# Patient Record
Sex: MALE | Race: WHITE | NOT HISPANIC OR LATINO
[De-identification: names, ages, dates, MRNs, and addresses within clinical notes are randomized per-mention and may not be internally consistent; named-entity substitution may affect disease eponyms.]

---

## 2018-02-02 ENCOUNTER — TRANSCRIPTION ENCOUNTER (OUTPATIENT)
Age: 33
End: 2018-02-02

## 2018-03-03 ENCOUNTER — TRANSCRIPTION ENCOUNTER (OUTPATIENT)
Age: 33
End: 2018-03-03

## 2018-11-10 ENCOUNTER — TRANSCRIPTION ENCOUNTER (OUTPATIENT)
Age: 33
End: 2018-11-10

## 2018-12-15 ENCOUNTER — TRANSCRIPTION ENCOUNTER (OUTPATIENT)
Age: 33
End: 2018-12-15

## 2019-04-20 ENCOUNTER — TRANSCRIPTION ENCOUNTER (OUTPATIENT)
Age: 34
End: 2019-04-20

## 2019-09-01 ENCOUNTER — TRANSCRIPTION ENCOUNTER (OUTPATIENT)
Age: 34
End: 2019-09-01

## 2020-03-01 ENCOUNTER — TRANSCRIPTION ENCOUNTER (OUTPATIENT)
Age: 35
End: 2020-03-01

## 2020-03-06 ENCOUNTER — TRANSCRIPTION ENCOUNTER (OUTPATIENT)
Age: 35
End: 2020-03-06

## 2020-04-30 ENCOUNTER — TRANSCRIPTION ENCOUNTER (OUTPATIENT)
Age: 35
End: 2020-04-30

## 2020-07-09 ENCOUNTER — TRANSCRIPTION ENCOUNTER (OUTPATIENT)
Age: 35
End: 2020-07-09

## 2020-07-17 ENCOUNTER — TRANSCRIPTION ENCOUNTER (OUTPATIENT)
Age: 35
End: 2020-07-17

## 2020-07-21 ENCOUNTER — APPOINTMENT (OUTPATIENT)
Dept: SURGERY | Facility: CLINIC | Age: 35
End: 2020-07-21
Payer: COMMERCIAL

## 2020-07-21 VITALS
HEART RATE: 60 BPM | TEMPERATURE: 97.9 F | BODY MASS INDEX: 23.97 KG/M2 | WEIGHT: 203 LBS | SYSTOLIC BLOOD PRESSURE: 144 MMHG | DIASTOLIC BLOOD PRESSURE: 89 MMHG | RESPIRATION RATE: 16 BRPM | HEIGHT: 77 IN | OXYGEN SATURATION: 98 %

## 2020-07-21 DIAGNOSIS — Z80.7 FAMILY HISTORY OF OTHER MALIGNANT NEOPLASMS OF LYMPHOID, HEMATOPOIETIC AND RELATED TISSUES: ICD-10-CM

## 2020-07-21 DIAGNOSIS — K62.89 OTHER SPECIFIED DISEASES OF ANUS AND RECTUM: ICD-10-CM

## 2020-07-21 DIAGNOSIS — Z83.71 FAMILY HISTORY OF COLONIC POLYPS: ICD-10-CM

## 2020-07-21 DIAGNOSIS — Z82.49 FAMILY HISTORY OF ISCHEMIC HEART DISEASE AND OTHER DISEASES OF THE CIRCULATORY SYSTEM: ICD-10-CM

## 2020-07-21 DIAGNOSIS — K60.0 ACUTE ANAL FISSURE: ICD-10-CM

## 2020-07-21 DIAGNOSIS — Z91.89 OTHER SPECIFIED PERSONAL RISK FACTORS, NOT ELSEWHERE CLASSIFIED: ICD-10-CM

## 2020-07-21 PROCEDURE — 99203 OFFICE O/P NEW LOW 30 MIN: CPT

## 2020-07-21 RX ORDER — MULTIVITAMIN
TABLET ORAL
Refills: 0 | Status: ACTIVE | COMMUNITY

## 2020-07-21 RX ORDER — UBIDECARENONE/VIT E ACET 100MG-5
CAPSULE ORAL
Refills: 0 | Status: ACTIVE | COMMUNITY

## 2020-07-21 RX ORDER — BACILLUS COAGULANS/INULIN 1B-250 MG
CAPSULE ORAL
Refills: 0 | Status: ACTIVE | COMMUNITY

## 2020-07-21 NOTE — CONSULT LETTER
[Dear  ___] : Dear  [unfilled], [Consult Letter:] : I had the pleasure of evaluating your patient, [unfilled]. [Please see my note below.] : Please see my note below. [Consult Closing:] : Thank you very much for allowing me to participate in the care of this patient.  If you have any questions, please do not hesitate to contact me. [Sincerely,] : Sincerely, [FreeTextEntry3] : Eamon Scanlon M.D., F.A.C.S, F.A.S.C.R.S [DrRadhika  ___] : Dr. TRUONG

## 2020-07-21 NOTE — PHYSICAL EXAM
[Abdomen Tenderness] : ~T No ~M abdominal tenderness [Wheezing] : no wheezing was heard [Normal Rate and Rhythm] : normal rate and rhythm [No Rash or Lesion] : No rash or lesion [Alert] : alert [Calm] : calm [de-identified] : Perianal inspection reveals an acute posterior midline anal fissure.  There is no fistula or abscess.  Anal rectal tone is increased.  Anoscopy was deferred due to his level of discomfort.  There are no enlarged external hemorrhoids or prolapsing internal hemorrhoids [de-identified] : nad [de-identified] : nl

## 2020-07-21 NOTE — ASSESSMENT
[FreeTextEntry1] : In summary the patient has an acute posterior midline anal fissure.  I prescribed topical nitroglycerin and recommended fiber supplementation stool softeners and warm tub baths.  I instructed him to call me in 3 weeks.  If his symptoms persist I would recommend Botox injection.  Once his fissure has healed he should follow-up with Dr. Perez for a screening colonoscoto be 100% sure that there is no proximal source of his rectal bleeding.

## 2020-07-21 NOTE — HISTORY OF PRESENT ILLNESS
[FreeTextEntry1] : Tee is a 35 year old male here for evaluation of rectal bleeding. The patient reports intermittent BRBPR with BMs for the past decade. He denies severe pain, but does report discomfort when moving his bowels. He is occasionally constipated, now taking fiber supplements daily. He has 1-2 normal formed BMs daily. He denies prolapsing hemorrhoidal tissue. He has never had a colonoscopy. He denies a family history of Colon/ Rectal cancer.

## 2020-09-09 ENCOUNTER — APPOINTMENT (OUTPATIENT)
Dept: PULMONOLOGY | Facility: CLINIC | Age: 35
End: 2020-09-09
Payer: COMMERCIAL

## 2020-09-09 VITALS
DIASTOLIC BLOOD PRESSURE: 68 MMHG | RESPIRATION RATE: 16 BRPM | BODY MASS INDEX: 25.27 KG/M2 | WEIGHT: 214 LBS | SYSTOLIC BLOOD PRESSURE: 124 MMHG | HEIGHT: 77 IN | HEART RATE: 56 BPM | OXYGEN SATURATION: 97 % | TEMPERATURE: 96.1 F

## 2020-09-09 DIAGNOSIS — Z87.898 PERSONAL HISTORY OF OTHER SPECIFIED CONDITIONS: ICD-10-CM

## 2020-09-09 DIAGNOSIS — Z83.3 FAMILY HISTORY OF DIABETES MELLITUS: ICD-10-CM

## 2020-09-09 DIAGNOSIS — U07.1 COVID-19: ICD-10-CM

## 2020-09-09 DIAGNOSIS — Z86.59 PERSONAL HISTORY OF OTHER MENTAL AND BEHAVIORAL DISORDERS: ICD-10-CM

## 2020-09-09 DIAGNOSIS — Z87.19 PERSONAL HISTORY OF OTHER DISEASES OF THE DIGESTIVE SYSTEM: ICD-10-CM
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
## 2020-09-09 DIAGNOSIS — Z88.9 ALLERGY STATUS TO UNSPECIFIED DRUGS, MEDICAMENTS AND BIOLOGICAL SUBSTANCES: ICD-10-CM

## 2020-09-09 DIAGNOSIS — Z83.79 FAMILY HISTORY OF OTHER DISEASES OF THE DIGESTIVE SYSTEM: ICD-10-CM

## 2020-09-09 DIAGNOSIS — Z78.9 OTHER SPECIFIED HEALTH STATUS: ICD-10-CM

## 2020-09-09 PROCEDURE — 71046 X-RAY EXAM CHEST 2 VIEWS: CPT

## 2020-09-09 PROCEDURE — 99204 OFFICE O/P NEW MOD 45 MIN: CPT | Mod: 25

## 2020-09-10 NOTE — ADDENDUM
[FreeTextEntry1] : Documented by Mega Rosario acting as a scribe for Dr. Anderson Aly on 09/09/2020 \par \par All medical record entries made by the Scribe were at my, Dr. Anderson Aly's, direction and personally dictated by me on 09/09/2020 . I have reviewed the chart and agree that the record accurately reflects my personal performance of the history, physical exam, assessment and plan. I have also personally directed, reviewed, and agree with the discharge instructions.

## 2020-09-10 NOTE — HISTORY OF PRESENT ILLNESS
[TextBox_4] : Mr. BILLY TUCKER is a 35 year old male coming into the office for an initial evaluation. His chief complaint is chest discomfort. \par -COVID-19 Sx include: Sore throat, Fevers, Sinus infection, SOB, PNA, \par -he notes that he had CT done at [facility] which showed PNA. \par -every third day he feels like his ribs are burning/ aching, feels a wheeze even though he isn't wheezing. During these times his O2 drop to 94-95%. \par -he had a fever once after COVID-19. \par -reports chest pain/ pressure. \par -reports legs/ arms muscle aches and pains. He has a pinch nerve which could be causing this problem. \par -he states that he does snore. \par -wakes up not willing to get up. \par -He reports being able to fall asleep while watching a boring TV show. \par -He reports being unable to fall asleep as a passenger in a car for over an hour \par -Memory and Concentration are alright but could be better. \par -reports that he has vivid dreams \par -reports itchy eyes. \par -reports a PND. \par -his allergies are active during the month of June. \par -reports that he has been going for cognitive therapy due to a panic disorder. \par \par -He denies any diarrhea, constipation, dysphagia, dizziness, sour taste in the mouth, leg swelling, leg pain, heartburn, reflux.

## 2020-09-10 NOTE — ASSESSMENT
[FreeTextEntry1] : Mr. BILLY TUCKER is a 35 year old male who has a history of GERD, allergies, anxiety, anal fissure, s/p COVID-19 who now comes in for pulmonary evaluation following COVID-19 infection in March 2020. \par \par The patient's SOB is felt to be multifactorial:\par -pulmonary \par    -COVID-19 induced bronchospasm \par - Poor breathing mechanics (anxiety) \par - Overweight/ Out-of Shape\par - ?Cardiac Disease - no evidence \par \par \par Problem 1: COVID-19 Induced Bronchospasm\par -Add Trelegy 1 inhalation QD \par -Add Xopenex (0.63) via nebulizer or xopenex HFA-TID\par -Gargle and Spit after use of inhaler \par -Full PFTs to follow \par \par -Inhaler technique reviewed as well as oral hygiene techniques reviewed with patient. Avoidance of cold air, extremes of temperature, rescue inhaler should be used before exercise. Order of medication reviewed with patient. Recommended use of a cool mist humidifier in the bedroom. \par \par Problem 2: s/p COVID-19 Pneumonitis \par -Radiographically resolved \par -Full PFTs to follow \par \par Problem 3:Allergies and Sinus \par -Flonase 1 sniff/nostril BID \par -Zyrtec 10mg qhs PRN \par \par Problem 4:GERD\par - Things to avoid including overeating, spicy foods, tight clothing, eating within three hours of bed, this list is not all inclusive.\par \par - For treatment of reflux, possible options discussed including diet control, H2 blockers, PPIs, as well as coating motility agents discussed as treatment options. Timing of meals and proximity of last meal to sleep were discussed. If symptoms persist, a formal gastrointestinal evaluation is needed. \par \par Problem 5: ?JIE\par -Complete Home Sleep Study. \par -Sleep apnea is associated with adverse clinical consequences which an affect most organ systems. Cardiovascular disease risk includes arrhythmias, atrial fibrillation, hypertension, coronary artery disease, and stroke. Metabolic disorders include diabetes type 2, non-alcoholic fatty liver disease. Mood disorder especially depression; and cognitive decline especially in the elderly. Associations with chronic reflux/Meadows’s esophagus some but not all inclusive. \par -Reasons include arousal consistent with hypopnea; respiratory events most prominent in REM sleep or supine position; therefore sleep staging and body position are important for accurate diagnosis and estimation of AHI. \par \par Problem 6: ?RLS \par -Studies to complete: iron studies, thyroid function test, free and total testosterone level. \par \par - Restless Legs Syndrome (RLS), also known as Houser-Ekbom Disease, is a common sleep -related movement disorder. About 1 in 10 adults in the [place] have problems from restless leg syndrome. It also can be seen in about 2% of children. Women are twice as likely as men to have RLS. People with RLS will have symptoms most often during times when they are less active, especially at bedtime. RLS most often causes an overwhelming urge to move your legs and sometimes other parts of your body. This urge is associated with unpleasant sensations in different parts of th body. The symptoms can be mild to severe and can affect your ability to go to sleep and stay asleep. People with RLS often sleep less at night and feel more tired during the day. \par \par Problem 7: Anxiety \par -recommended to read "The Gift of Maybe" by Brittney Abad \par \par Problem 8: Overweight\par - Weight loss, exercise, and diet control were discussed and are highly encouraged. Treatment options were given such as, aqua therapy, and contacting a nutritionist. Recommended to use the elliptical, stationary bike, less use of treadmill. Mindful eating was explained to the patient Obesity is associated with worsening asthma, shortness of breath, and potential for cardiac disease, diabetes, and other underlying medical conditions.\par \par Problem 9: Poor Breathing Mechanics\par - Proper breathing techniques were reviewed with an emphasis of exhalation. Patient instructed to breath in for 1 second and out for four seconds. Patient was encouraged to not talk while walking.\par \par Problem 10: Health maintenance\par -s/p flu shot\par -recommended strep pneumonia vaccines: Prevnar-13 vaccine, followed by Pneumo vaccine 23 one year following (after the age of 65)\par -recommended early intervention for URIs\par -recommended regular osteoporosis evaluations\par -recommended early dermatological evaluations\par -recommended after the age of 50 to the age of 70, colonoscopy every 5 years\par \par f/u in 6-8 weeks\par pt is encouraged to call or fax the office with any questions or concerns.

## 2020-09-10 NOTE — REASON FOR VISIT
[Initial] : an initial visit [TextBox_44] : COVID-19 Induced Bronchospasm, s/p COVID-19 Pneumonitis, Allergies, GERD, ?JIE, ?RLS

## 2020-09-10 NOTE — PHYSICAL EXAM
[No Acute Distress] : no acute distress [II] : Mallampati Class: II [Normal Oropharynx] : normal oropharynx [Normal Appearance] : normal appearance [Normal S1, S2] : normal s1, s2 [No Neck Mass] : no neck mass [Normal Rate/Rhythm] : normal rate/rhythm [No Murmurs] : no murmurs [No Resp Distress] : no resp distress [Clear to Auscultation Bilaterally] : clear to auscultation bilaterally [No Abnormalities] : no abnormalities [Benign] : benign [No Clubbing] : no clubbing [Normal Gait] : normal gait [No Edema] : no edema [No Cyanosis] : no cyanosis [FROM] : FROM [Oriented x3] : oriented x3 [Normal Color/ Pigmentation] : normal color/ pigmentation [Normal Affect] : normal affect [No Focal Deficits] : no focal deficits [TextBox_68] : I:E 1:3, clear

## 2020-09-11 RX ORDER — LEVALBUTEROL TARTRATE 45 UG/1
45 AEROSOL, METERED ORAL
Qty: 1 | Refills: 5 | Status: DISCONTINUED | COMMUNITY
Start: 2020-09-10 | End: 2020-09-11

## 2020-09-14 ENCOUNTER — TRANSCRIPTION ENCOUNTER (OUTPATIENT)
Age: 35
End: 2020-09-14

## 2020-09-16 ENCOUNTER — LABORATORY RESULT (OUTPATIENT)
Age: 35
End: 2020-09-16

## 2020-09-17 ENCOUNTER — TRANSCRIPTION ENCOUNTER (OUTPATIENT)
Age: 35
End: 2020-09-17

## 2020-09-17 LAB
24R-OH-CALCIDIOL SERPL-MCNC: 39.3 PG/ML
25(OH)D3 SERPL-MCNC: 36.3 NG/ML
BASOPHILS # BLD AUTO: 0.02 K/UL
BASOPHILS NFR BLD AUTO: 0.5 %
EOSINOPHIL # BLD AUTO: 0.03 K/UL
EOSINOPHIL NFR BLD AUTO: 0.7 %
FERRITIN SERPL-MCNC: 42 NG/ML
HCT VFR BLD CALC: 45.6 %
HGB BLD-MCNC: 14.7 G/DL
IMM GRANULOCYTES NFR BLD AUTO: 0.2 %
IRON SATN MFR SERPL: 34 %
IRON SERPL-MCNC: 99 UG/DL
LYMPHOCYTES # BLD AUTO: 1.35 K/UL
LYMPHOCYTES NFR BLD AUTO: 30.4 %
MAN DIFF?: NORMAL
MCHC RBC-ENTMCNC: 29.5 PG
MCHC RBC-ENTMCNC: 32.2 GM/DL
MCV RBC AUTO: 91.6 FL
MONOCYTES # BLD AUTO: 0.41 K/UL
MONOCYTES NFR BLD AUTO: 9.2 %
NEUTROPHILS # BLD AUTO: 2.62 K/UL
NEUTROPHILS NFR BLD AUTO: 59 %
PLATELET # BLD AUTO: 216 K/UL
RBC # BLD: 4.98 M/UL
RBC # FLD: 12.4 %
T3FREE SERPL-MCNC: 2.71 PG/ML
T4 FREE SERPL-MCNC: 1.1 NG/DL
TIBC SERPL-MCNC: 292 UG/DL
TSH SERPL-ACNC: 1.73 UIU/ML
UIBC SERPL-MCNC: 194 UG/DL
WBC # FLD AUTO: 4.44 K/UL

## 2020-09-18 ENCOUNTER — TRANSCRIPTION ENCOUNTER (OUTPATIENT)
Age: 35
End: 2020-09-18

## 2020-09-18 LAB
A ALTERNATA IGE QN: <0.1 KUA/L
A FUMIGATUS IGE QN: <0.1 KUA/L
C ALBICANS IGE QN: <0.1 KUA/L
C HERBARUM IGE QN: <0.1 KUA/L
CAT DANDER IGE QN: 0.84 KUA/L
CLAM IGE QN: <0.1 KUA/L
CODFISH IGE QN: <0.1 KUA/L
COMMON RAGWEED IGE QN: <0.1 KUA/L
CORN IGE QN: <0.1 KUA/L
COW MILK IGE QN: <0.1 KUA/L
D FARINAE IGE QN: 2.33 KUA/L
D PTERONYSS IGE QN: 1.42 KUA/L
DEPRECATED A ALTERNATA IGE RAST QL: 0
DEPRECATED A FUMIGATUS IGE RAST QL: 0
DEPRECATED C ALBICANS IGE RAST QL: 0
DEPRECATED C HERBARUM IGE RAST QL: 0
DEPRECATED CAT DANDER IGE RAST QL: 2
DEPRECATED CLAM IGE RAST QL: 0
DEPRECATED CODFISH IGE RAST QL: 0
DEPRECATED COMMON RAGWEED IGE RAST QL: 0
DEPRECATED CORN IGE RAST QL: 0
DEPRECATED COW MILK IGE RAST QL: 0
DEPRECATED D FARINAE IGE RAST QL: 2
DEPRECATED D PTERONYSS IGE RAST QL: 2
DEPRECATED DOG DANDER IGE RAST QL: 0
DEPRECATED EGG WHITE IGE RAST QL: 0
DEPRECATED M RACEMOSUS IGE RAST QL: 0
DEPRECATED PEANUT IGE RAST QL: 0
DEPRECATED ROACH IGE RAST QL: 0
DEPRECATED SCALLOP IGE RAST QL: <0.1 KUA/L
DEPRECATED SESAME SEED IGE RAST QL: 0
DEPRECATED SHRIMP IGE RAST QL: 0
DEPRECATED SOYBEAN IGE RAST QL: 0
DEPRECATED TIMOTHY IGE RAST QL: 3
DEPRECATED WALNUT IGE RAST QL: 0
DEPRECATED WHEAT IGE RAST QL: 0
DEPRECATED WHITE OAK IGE RAST QL: NORMAL
DOG DANDER IGE QN: <0.1 KUA/L
EGG WHITE IGE QN: <0.1 KUA/L
M RACEMOSUS IGE QN: <0.1 KUA/L
PEANUT IGE QN: <0.1 KUA/L
ROACH IGE QN: <0.1 KUA/L
SCALLOP IGE QN: 0
SCALLOP IGE QN: <0.1 KUA/L
SESAME SEED IGE QN: <0.1 KUA/L
SOYBEAN IGE QN: <0.1 KUA/L
TIMOTHY IGE QN: 6.1 KUA/L
TOTAL IGE SMQN RAST: 28 KU/L
WALNUT IGE QN: <0.1 KUA/L
WHEAT IGE QN: <0.1 KUA/L
WHITE OAK IGE QN: 0.3 KUA/L

## 2020-09-18 RX ORDER — ALBUTEROL SULFATE 90 UG/1
108 (90 BASE) AEROSOL, METERED RESPIRATORY (INHALATION) EVERY 6 HOURS
Qty: 1 | Refills: 2 | Status: DISCONTINUED | COMMUNITY
Start: 2020-09-11 | End: 2020-09-18

## 2020-09-24 ENCOUNTER — TRANSCRIPTION ENCOUNTER (OUTPATIENT)
Age: 35
End: 2020-09-24

## 2020-09-28 ENCOUNTER — TRANSCRIPTION ENCOUNTER (OUTPATIENT)
Age: 35
End: 2020-09-28

## 2020-10-07 ENCOUNTER — TRANSCRIPTION ENCOUNTER (OUTPATIENT)
Age: 35
End: 2020-10-07

## 2020-10-07 ENCOUNTER — APPOINTMENT (OUTPATIENT)
Dept: NEUROLOGY | Facility: CLINIC | Age: 35
End: 2020-10-07

## 2020-10-07 VITALS — TEMPERATURE: 97.1 F

## 2020-10-07 RX ORDER — FAMOTIDINE 40 MG/1
40 TABLET, FILM COATED ORAL
Qty: 90 | Refills: 1 | Status: ACTIVE | COMMUNITY
Start: 2020-10-07 | End: 1900-01-01

## 2020-10-12 ENCOUNTER — TRANSCRIPTION ENCOUNTER (OUTPATIENT)
Age: 35
End: 2020-10-12

## 2020-10-13 ENCOUNTER — APPOINTMENT (OUTPATIENT)
Dept: SURGERY | Facility: CLINIC | Age: 35
End: 2020-10-13
Payer: COMMERCIAL

## 2020-10-13 VITALS
DIASTOLIC BLOOD PRESSURE: 81 MMHG | TEMPERATURE: 97.5 F | HEART RATE: 83 BPM | SYSTOLIC BLOOD PRESSURE: 131 MMHG | RESPIRATION RATE: 15 BRPM | OXYGEN SATURATION: 100 %

## 2020-10-13 DIAGNOSIS — K62.5 HEMORRHAGE OF ANUS AND RECTUM: ICD-10-CM

## 2020-10-13 PROCEDURE — 46600 DIAGNOSTIC ANOSCOPY SPX: CPT

## 2020-10-13 PROCEDURE — 99213 OFFICE O/P EST LOW 20 MIN: CPT | Mod: 25

## 2020-10-13 RX ORDER — FAMOTIDINE 10 MG/1
TABLET, FILM COATED ORAL
Refills: 0 | Status: DISCONTINUED | COMMUNITY
End: 2020-10-13

## 2020-10-13 NOTE — HISTORY OF PRESENT ILLNESS
[FreeTextEntry1] : Tee is a 36 y/o male here for follow up visit. Last seen on 7/21/20, patient with an acute posterior midline anal fissure.  Topical nitroglycerin prescribed and recommended fiber supplementation stool softeners and warm tub baths. Colonoscopy should be done once fissure is healed to be sure there is no proximal source of his rectal bleeding. \par \par Today, patient reports one episode of rectal bleeding a few weeks ago. Pain has resolved. Reports straining to have a BM. On stool softeners daily. Has daily rectal itching.

## 2020-10-13 NOTE — PHYSICAL EXAM
[Abdomen Tenderness] : ~T No ~M abdominal tenderness [Wheezing] : no wheezing was heard [Normal Rate and Rhythm] : normal rate and rhythm [No Rash or Lesion] : No rash or lesion [Alert] : alert [Calm] : calm [de-identified] : Perianal inspection is normal.  His fissure has healed.  There is no fistula or abscess.  Digital rectal examination is normal.  Anoscopy reveals friable circumferential enlarged internal hemorrhoids with otherwise normal distal rectal mucosa. [de-identified] : nad [de-identified] : nl

## 2020-10-13 NOTE — ASSESSMENT
[FreeTextEntry1] : In summary the patient had an anal fissure which has healed.  He had an episode of blood in the stool a few weeks ago.  This may have been from his healing fissure or his enlarged internal hemorrhoids.  I again recommended that he follow-up with  for a colonoscopy to ensure that there is no proximal source of bleeding.  If his bleeding recurs and his colonoscopy is otherwise negative I would recommend rubber band ligations of his internal hemorrhoids.

## 2020-10-16 ENCOUNTER — TRANSCRIPTION ENCOUNTER (OUTPATIENT)
Age: 35
End: 2020-10-16

## 2020-10-19 ENCOUNTER — FORM ENCOUNTER (OUTPATIENT)
Age: 35
End: 2020-10-19

## 2020-10-20 ENCOUNTER — APPOINTMENT (OUTPATIENT)
Dept: SLEEP CENTER | Facility: CLINIC | Age: 35
End: 2020-10-20
Payer: COMMERCIAL

## 2020-10-20 ENCOUNTER — APPOINTMENT (OUTPATIENT)
Dept: NEUROLOGY | Facility: CLINIC | Age: 35
End: 2020-10-20
Payer: COMMERCIAL

## 2020-10-20 ENCOUNTER — OUTPATIENT (OUTPATIENT)
Dept: OUTPATIENT SERVICES | Facility: HOSPITAL | Age: 35
LOS: 1 days | End: 2020-10-20
Payer: COMMERCIAL

## 2020-10-20 VITALS
BODY MASS INDEX: 24.09 KG/M2 | WEIGHT: 204 LBS | SYSTOLIC BLOOD PRESSURE: 145 MMHG | HEART RATE: 69 BPM | DIASTOLIC BLOOD PRESSURE: 79 MMHG | HEIGHT: 77 IN

## 2020-10-20 DIAGNOSIS — G71.02 FACIOSCAPULOHUMERAL MUSCULAR DYSTROPHY: ICD-10-CM

## 2020-10-20 DIAGNOSIS — G54.0 BRACHIAL PLEXUS DISORDERS: ICD-10-CM

## 2020-10-20 PROCEDURE — 95806 SLEEP STUDY UNATT&RESP EFFT: CPT

## 2020-10-20 PROCEDURE — 99205 OFFICE O/P NEW HI 60 MIN: CPT

## 2020-10-20 PROCEDURE — 95806 SLEEP STUDY UNATT&RESP EFFT: CPT | Mod: 26

## 2020-10-20 RX ORDER — AZITHROMYCIN 500 MG/1
500 TABLET, FILM COATED ORAL
Qty: 5 | Refills: 0 | Status: DISCONTINUED | COMMUNITY
Start: 2020-09-24 | End: 2020-10-20

## 2020-10-20 RX ORDER — LEVALBUTEROL HYDROCHLORIDE 0.63 MG/3ML
0.63 SOLUTION RESPIRATORY (INHALATION)
Qty: 120 | Refills: 3 | Status: DISCONTINUED | COMMUNITY
Start: 2020-09-10 | End: 2020-10-20

## 2020-10-20 NOTE — ASSESSMENT
[FreeTextEntry1] : This nice gentleman has a very interesting and difficulty DDx to tease out.  \par \par He clearly has had two episodes of left brachial plexitis which by history was inflammatory and spontaneously has improved.  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  The possibility of HNPP is remote but if sx's recur will do genetics for that. \par \par Will start w/u with repeat CPK and have him back for EMG.  Next would then do FSHD southern blot for genetic confirmation.

## 2020-10-20 NOTE — HISTORY OF PRESENT ILLNESS
[FreeTextEntry1] : Patient referred for evaluation of weakness.  Patient states that this past November he awoke with a dull then deep and boring pain which progressed to weakness left arm proximally, which progressed and worsened over about three days.  The pain dissipated after about week and he was left with pronounced proximal weakness.  He recovered some function LUE and was doing PT, then in March he had COVID and was in bed for a month.  Now he is left with mild sporadic ache and shoulder girdle atrophy with some residual proximal weakness.  \par \par He states that he had an almost identical episode 13 years ago just after college, with recovery over about 6-12 months.  \par \par He was not treated in either case with steroids of IVIg.  \par \par He denies weakness in RUE and LE's but notes during PT some exercises of RUE are a bit difficulty.  One neurologist has states right scap winging.  He denies SOB, no diplopia, no dysphagia. \par \par He states the D3 right foot goes numb occasionally.  He has occasional tingling fingers of both hands.  \par \par He denies FH of genetic neuropathy, one cousin with muscular dystrophy, type not known

## 2020-10-20 NOTE — PHYSICAL EXAM
[General Appearance - Alert] : alert [General Appearance - In No Acute Distress] : in no acute distress [Person] : oriented to person [Time] : oriented to time [Place] : oriented to place [Concentration Intact] : normal concentrating ability [Visual Intact] : visual attention was ~T not ~L decreased [Naming Objects] : no difficulty naming common objects [Writing A Sentence] : no difficulty writing a sentence [Repeating Phrases] : no difficulty repeating a phrase [Fluency] : fluency intact [Comprehension] : comprehension intact [Reading] : reading intact [Past History] : adequate knowledge of personal past history [Cranial Nerves Optic (II)] : visual acuity intact bilaterally,  visual fields full to confrontation, pupils equal round and reactive to light [Cranial Nerves Trigeminal (V)] : facial sensation intact symmetrically [Cranial Nerves Oculomotor (III)] : extraocular motion intact [Cranial Nerves Facial (VII)] : face symmetrical [Cranial Nerves Vestibulocochlear (VIII)] : hearing was intact bilaterally [Cranial Nerves Accessory (XI - Cranial And Spinal)] : head turning and shoulder shrug symmetric [Cranial Nerves Glossopharyngeal (IX)] : tongue and palate midline [Cranial Nerves Hypoglossal (XII)] : there was no tongue deviation with protrusion [Motor Tone] : muscle tone was normal in all four extremities [Motor Handedness Right-Handed] : the patient is right hand dominant [Hand Weakness Right] : normal hand  [Hand Weakness Left] : normal hand  [+4] : knee flexion +4/5 [4] : hip extension 4/5 [5] : ankle plantar flexion 5/5 [Sensation Tactile Decrease] : light touch was intact [Sensation Vibration Decrease] : vibration was intact [Proprioception] : proprioception was intact [Tremor] : no tremor present [Past-pointing] : there was no past-pointing [3+] : Ankle jerk left 3+ [Plantar Reflex Right Only] : normal on the right [Plantar Reflex Left Only] : normal on the left [FreeTextEntry8] : poor squat, anterior pelvic tilt with gait [FreeTextEntry6] : left infra and supraspintatus atrophy, right more than left scap winging.  Bilateral pes cavus and hammer toes.  Bilateral anterior axillary folds [Jugular Venous Distention Increased] : there was no jugular-venous distention [Neck Cervical Mass (___cm)] : no neck mass was observed [Neck Appearance] : the appearance of the neck was normal [Thyroid Diffuse Enlargement] : the thyroid was not enlarged [] : no respiratory distress [Thyroid Nodule] : there were no palpable thyroid nodules [Auscultation Breath Sounds / Voice Sounds] : lungs were clear to auscultation bilaterally [Heart Rate And Rhythm] : heart rate was normal and rhythm regular [Heart Sounds] : normal S1 and S2 [Heart Sounds Gallop] : no gallops [Murmurs] : no murmurs [Heart Sounds Pericardial Friction Rub] : no pericardial rub

## 2020-10-21 LAB — CK SERPL-CCNC: 84 U/L

## 2020-10-23 ENCOUNTER — NON-APPOINTMENT (OUTPATIENT)
Age: 35
End: 2020-10-23

## 2020-10-23 ENCOUNTER — TRANSCRIPTION ENCOUNTER (OUTPATIENT)
Age: 35
End: 2020-10-23

## 2020-10-26 DIAGNOSIS — G47.33 OBSTRUCTIVE SLEEP APNEA (ADULT) (PEDIATRIC): ICD-10-CM

## 2020-11-03 ENCOUNTER — TRANSCRIPTION ENCOUNTER (OUTPATIENT)
Age: 35
End: 2020-11-03

## 2020-11-06 ENCOUNTER — APPOINTMENT (OUTPATIENT)
Dept: PULMONOLOGY | Facility: CLINIC | Age: 35
End: 2020-11-06
Payer: COMMERCIAL

## 2020-11-06 VITALS
WEIGHT: 206 LBS | RESPIRATION RATE: 17 BRPM | DIASTOLIC BLOOD PRESSURE: 65 MMHG | TEMPERATURE: 97.2 F | SYSTOLIC BLOOD PRESSURE: 120 MMHG | HEIGHT: 77 IN | OXYGEN SATURATION: 98 % | HEART RATE: 64 BPM | BODY MASS INDEX: 24.32 KG/M2

## 2020-11-06 DIAGNOSIS — Z87.09 PERSONAL HISTORY OF OTHER DISEASES OF THE RESPIRATORY SYSTEM: ICD-10-CM

## 2020-11-06 PROCEDURE — 99214 OFFICE O/P EST MOD 30 MIN: CPT | Mod: 25

## 2020-11-06 PROCEDURE — 99072 ADDL SUPL MATRL&STAF TM PHE: CPT

## 2020-11-06 NOTE — ASSESSMENT
[FreeTextEntry1] : Mr. BILLY TUCKER is a 35 year old male who has a history of GERD, allergies, anxiety, anal fissure, s/p COVID-19 who now comes in for pulmonary evaluation following COVID-19 infection in March 2020. - improved \par \par The patient's SOB is felt to be multifactorial:\par -pulmonary \par    -COVID-19 induced bronchospasm \par - Poor breathing mechanics (anxiety) \par - Overweight/ Out-of Shape\par - ?Cardiac Disease - no evidence \par \par \par Problem 1: COVID-19 Induced Bronchospasm\par -continue Trelegy 1 inhalation QD \par -continue Xopenex (0.63) via nebulizer or xopenex HFA-TID\par -Gargle and Spit after use of inhaler \par -Full PFTs to follow \par \par -Inhaler technique reviewed as well as oral hygiene techniques reviewed with patient. Avoidance of cold air, extremes of temperature, rescue inhaler should be used before exercise. Order of medication reviewed with patient. Recommended use of a cool mist humidifier in the bedroom. \par \par Problem 2: s/p COVID-19 Pneumonitis \par -Radiographically resolved \par -Full PFTs to follow (pending)\par \par Problem 3:Allergies and Sinus (+)\par -Flonase 1 sniff/nostril BID \par -Zyrtec 10mg qhs PRN \par - Environmental measures for allergies were encouraged including mattress and pillow cover, air purifier, and environmental controls.\par \par Problem 4:GERD\par - Things to avoid including overeating, spicy foods, tight clothing, eating within three hours of bed, this list is not all inclusive.\par - For treatment of reflux, possible options discussed including diet control, H2 blockers, PPIs, as well as coating motility agents discussed as treatment options. Timing of meals and proximity of last meal to sleep were discussed. If symptoms persist, a formal gastrointestinal evaluation is needed. \par \par Problem 5: No JIE\par -s/p Home Sleep Study. (-)\par -Sleep apnea is associated with adverse clinical consequences which an affect most organ systems. Cardiovascular disease risk includes arrhythmias, atrial fibrillation, hypertension, coronary artery disease, and stroke. Metabolic disorders include diabetes type 2, non-alcoholic fatty liver disease. Mood disorder especially depression; and cognitive decline especially in the elderly. Associations with chronic reflux/Meadows’s esophagus some but not all inclusive. \par -Reasons include arousal consistent with hypopnea; respiratory events most prominent in REM sleep or supine position; therefore sleep staging and body position are important for accurate diagnosis and estimation of AHI. \par \par Problem 6: ?RLS \par -S/P to complete: iron studies, thyroid function test, free and total testosterone level. (WNL)\par \par - Restless Legs Syndrome (RLS), also known as Houser-Ekbom Disease, is a common sleep -related movement disorder. About 1 in 10 adults in the [place] have problems from restless leg syndrome. It also can be seen in about 2% of children. Women are twice as likely as men to have RLS. People with RLS will have symptoms most often during times when they are less active, especially at bedtime. RLS most often causes an overwhelming urge to move your legs and sometimes other parts of your body. This urge is associated with unpleasant sensations in different parts of th body. The symptoms can be mild to severe and can affect your ability to go to sleep and stay asleep. People with RLS often sleep less at night and feel more tired during the day. \par \par Problem 7: Anxiety \par -recommended to read "The Gift of Maybe" by Brittney Abad \par \par Problem 8: Overweight - IMPROVED\par - Weight loss, exercise, and diet control were discussed and are highly encouraged. Treatment options were given such as, aqua therapy, and contacting a nutritionist. Recommended to use the elliptical, stationary bike, less use of treadmill. Mindful eating was explained to the patient Obesity is associated with worsening asthma, shortness of breath, and potential for cardiac disease, diabetes, and other underlying medical conditions.\par \par Problem 9: Poor Breathing Mechanics\par - Proper breathing techniques were reviewed with an emphasis of exhalation. Patient instructed to breath in for 1 second and out for four seconds. Patient was encouraged to not talk while walking.\par \par Problem 10: Health maintenance\par - recommended Quercetin 2000 mg \par - recommended SPM \par - recommended liposomal Glutathione 500 mg BID \par -s/p flu shot - 2020\par -recommended strep pneumonia vaccines: Prevnar-13 vaccine, followed by Pneumo vaccine 23 one year following (after the age of 65)\par -recommended early intervention for URIs\par -recommended regular osteoporosis evaluations\par -recommended early dermatological evaluations\par -recommended after the age of 50 to the age of 70, colonoscopy every 5 years\par \par f/u in 6-8 weeks\par pt is encouraged to call or fax the office with any questions or concerns.

## 2020-11-06 NOTE — ADDENDUM
[FreeTextEntry1] : Documented by Batool Mcclendon acting as a scribe for Dr. Anderson Aly on 11/06/2020 \par \par All medical record entries made by the Scribe were at my, Dr. Anderson Aly's, direction and personally dictated by me on 11/06/2020 . I have reviewed the chart and agree that the record accurately reflects my personal performance of the history, physical exam, assessment and plan. I have also personally directed, reviewed, and agree with the discharge instructions.

## 2020-11-06 NOTE — PHYSICAL EXAM

## 2020-11-06 NOTE — PROCEDURE
[FreeTextEntry1] : CT (SEP.9.2020) IMPRESSION: the pulmonary arteries appear patent without evidence of thrombus. Very faint groundglass opacity is seen peripherally in the right lower lobe likely related to residual disease from prior COVID infection. these findings appear improved since the prior chest x-ray. there is no evidence of focal left lung nodule on the current examination. Several shotty mediastinal lymph nodes are seen in the left periaortic/AP window region. \par \par Sleep study (oct.20.2020) revealed sleep apnea with an AHI/PRANEETH of 1.8, snore index of 0.4% and a low oxygen saturation of 89%

## 2020-11-06 NOTE — HISTORY OF PRESENT ILLNESS
[TextBox_4] : Mr. BILLY TUCKER is a 35 year old male coming into the office for an initial evaluation. His chief complaint is chest discomfort. \par - he has been feeling better, before this week he was having a stressful week. \par - when he came in last he was having chest pain every 3 days, but now its once a week. \par - he has been coughing / wheezing much less \par - sleep is a little off this week\par - he has been running once a week, he would like to do more but its  been cold out. \par - his appetite has been good. he switched his diet, he is mostly plant based with fish, eggs, and feta cheese. \par - he has been good allergy wise, he started doing the Navage a couple times a day and using Flonase. \par - he notes he is not snoring\par - He  denies any visual issues, headaches, nausea, vomiting, fever, chills, sweats, chest pains, chest pressure, diarrhea, constipation, dysphagia, myalgia, dizziness, leg swelling, leg pain, itchy eyes, itchy ears, heartburn, reflux, or sour taste in the mouth.\par

## 2020-11-10 ENCOUNTER — APPOINTMENT (OUTPATIENT)
Dept: NEUROLOGY | Facility: CLINIC | Age: 35
End: 2020-11-10
Payer: COMMERCIAL

## 2020-11-10 ENCOUNTER — TRANSCRIPTION ENCOUNTER (OUTPATIENT)
Age: 35
End: 2020-11-10

## 2020-11-10 VITALS — TEMPERATURE: 97.5 F

## 2020-11-10 PROCEDURE — 95908 NRV CNDJ TST 3-4 STUDIES: CPT

## 2020-11-10 PROCEDURE — 99072 ADDL SUPL MATRL&STAF TM PHE: CPT

## 2020-11-10 PROCEDURE — 95885 MUSC TST DONE W/NERV TST LIM: CPT | Mod: 59

## 2020-11-13 DIAGNOSIS — Z01.812 ENCOUNTER FOR PREPROCEDURAL LABORATORY EXAMINATION: ICD-10-CM

## 2020-11-30 ENCOUNTER — TRANSCRIPTION ENCOUNTER (OUTPATIENT)
Age: 35
End: 2020-11-30

## 2020-12-01 ENCOUNTER — LABORATORY RESULT (OUTPATIENT)
Age: 35
End: 2020-12-01

## 2020-12-04 ENCOUNTER — APPOINTMENT (OUTPATIENT)
Dept: PULMONOLOGY | Facility: CLINIC | Age: 35
End: 2020-12-04
Payer: COMMERCIAL

## 2020-12-04 ENCOUNTER — TRANSCRIPTION ENCOUNTER (OUTPATIENT)
Age: 35
End: 2020-12-04

## 2020-12-04 PROCEDURE — 99072 ADDL SUPL MATRL&STAF TM PHE: CPT

## 2020-12-04 PROCEDURE — 94010 BREATHING CAPACITY TEST: CPT

## 2020-12-04 PROCEDURE — 94727 GAS DIL/WSHOT DETER LNG VOL: CPT

## 2020-12-04 PROCEDURE — 94729 DIFFUSING CAPACITY: CPT

## 2020-12-10 ENCOUNTER — TRANSCRIPTION ENCOUNTER (OUTPATIENT)
Age: 35
End: 2020-12-10

## 2020-12-21 ENCOUNTER — TRANSCRIPTION ENCOUNTER (OUTPATIENT)
Age: 35
End: 2020-12-21

## 2020-12-29 ENCOUNTER — TRANSCRIPTION ENCOUNTER (OUTPATIENT)
Age: 35
End: 2020-12-29

## 2021-01-06 ENCOUNTER — RX RENEWAL (OUTPATIENT)
Age: 36
End: 2021-01-06

## 2021-01-07 ENCOUNTER — LABORATORY RESULT (OUTPATIENT)
Age: 36
End: 2021-01-07

## 2021-02-02 ENCOUNTER — TRANSCRIPTION ENCOUNTER (OUTPATIENT)
Age: 36
End: 2021-02-02

## 2021-02-02 RX ORDER — FLUTICASONE FUROATE, UMECLIDINIUM BROMIDE AND VILANTEROL TRIFENATATE 100; 62.5; 25 UG/1; UG/1; UG/1
100-62.5-25 POWDER RESPIRATORY (INHALATION)
Qty: 3 | Refills: 1 | Status: ACTIVE | COMMUNITY
Start: 2020-09-09 | End: 1900-01-01

## 2021-02-03 ENCOUNTER — TRANSCRIPTION ENCOUNTER (OUTPATIENT)
Age: 36
End: 2021-02-03

## 2021-02-08 ENCOUNTER — TRANSCRIPTION ENCOUNTER (OUTPATIENT)
Age: 36
End: 2021-02-08

## 2021-02-09 ENCOUNTER — TRANSCRIPTION ENCOUNTER (OUTPATIENT)
Age: 36
End: 2021-02-09

## 2021-02-19 ENCOUNTER — TRANSCRIPTION ENCOUNTER (OUTPATIENT)
Age: 36
End: 2021-02-19

## 2021-03-09 ENCOUNTER — TRANSCRIPTION ENCOUNTER (OUTPATIENT)
Age: 36
End: 2021-03-09

## 2021-03-10 ENCOUNTER — TRANSCRIPTION ENCOUNTER (OUTPATIENT)
Age: 36
End: 2021-03-10

## 2021-03-15 ENCOUNTER — APPOINTMENT (OUTPATIENT)
Dept: PULMONOLOGY | Facility: CLINIC | Age: 36
End: 2021-03-15
Payer: COMMERCIAL

## 2021-03-15 VITALS — BODY MASS INDEX: 23.97 KG/M2 | HEIGHT: 77 IN | WEIGHT: 203 LBS

## 2021-03-15 PROCEDURE — 99214 OFFICE O/P EST MOD 30 MIN: CPT | Mod: 95

## 2021-03-15 NOTE — ADDENDUM
[FreeTextEntry1] : Documented by Chester Curry acting as a scribe for Dr. Anderson Aly on 03/15/2021.\par \par All medical record entries made by the Scribe were at my, Dr. Anderson Aly's, direction and personally dictated by me on 03/15/2021. I have reviewed the chart and agree that the record accurately reflects my personal performance of the history, physical exam, assessment and plan. I have also personally directed, reviewed, and agree with the discharge instructions.

## 2021-03-15 NOTE — HISTORY OF PRESENT ILLNESS
[Home] : at home, [unfilled] , at the time of the visit. [Medical Office: ([facility])___] : at the medical office located in  [Verbal consent obtained from patient] : the patient, [unfilled] [TextBox_4] : Mr. BILLY TUCKER is a 35 year old male presenting to the office via video call for a follow up evaluation. His chief complaint is SOB\par -he reports he had been feeling generally well, but he has been feeling slightly under the weather for the past 4 days, since he received the 1st Covid vaccine Pfizer with sx of achiness, stomach cramps, and SOB.\par -he notes his dysphagia in late December has cleared\par -he states he has burning eyes at night\par -he reports he is no longer wheezing\par -he has been using his Trelegy regularly\par -he notes he has been taking Quercitin regularly\par -he denies any wheezing, chest pain, chest pressure, diarrhea, constipation, dysphagia, dizziness, sour taste in the mouth, leg swelling, leg pain, itchy eyes, itchy ears, heartburn, reflux, myalgias or arthralgias.

## 2021-03-15 NOTE — ASSESSMENT
[FreeTextEntry1] : Mr. BILLY TUCKER is a 35 year old male who has a history of GERD, allergies, anxiety, anal fissure, s/p COVID-19 who now presents to the office via video call for pulmonary evaluation following COVID-19 infection in March 2020. - improved s/p Covid-19 vaccine 3/2021\par \par The patient's SOB is felt to be multifactorial:\par -pulmonary \par    -COVID-19 induced bronchospasm \par - Poor breathing mechanics (anxiety) \par - Overweight/ Out-of Shape\par - ?Cardiac Disease - no evidence \par \par \par Problem 1: COVID-19 Induced Bronchospasm\par -continue Trelegy 100 1 inhalation QD \par -continue Xopenex (0.63) via nebulizer or xopenex HFA-TID\par -Gargle and Spit after use of inhaler \par -Full PFTs  - WNL\par \par -Inhaler technique reviewed as well as oral hygiene techniques reviewed with patient. Avoidance of cold air, extremes of temperature, rescue inhaler should be used before exercise. Order of medication reviewed with patient. Recommended use of a cool mist humidifier in the bedroom. \par \par Problem 2: s/p COVID-19 Pneumonitis (s/p Pfizer vaccine 3/2021)\par -Radiographically resolved \par -Full PFTs to follow (pending)\par \par Problem 3:Allergies and Sinus (+)\par -Flonase 1 sniff/nostril BID \par -Zyrtec 10mg qhs PRN \par - Environmental measures for allergies were encouraged including mattress and pillow cover, air purifier, and environmental controls.\par \par Problem 4:GERD\par - Things to avoid including overeating, spicy foods, tight clothing, eating within three hours of bed, this list is not all inclusive.\par - For treatment of reflux, possible options discussed including diet control, H2 blockers, PPIs, as well as coating motility agents discussed as treatment options. Timing of meals and proximity of last meal to sleep were discussed. If symptoms persist, a formal gastrointestinal evaluation is needed. \par \par Problem 5: No JIE\par -s/p Home Sleep Study. (-)\par -Sleep apnea is associated with adverse clinical consequences which an affect most organ systems. Cardiovascular disease risk includes arrhythmias, atrial fibrillation, hypertension, coronary artery disease, and stroke. Metabolic disorders include diabetes type 2, non-alcoholic fatty liver disease. Mood disorder especially depression; and cognitive decline especially in the elderly. Associations with chronic reflux/Meadows’s esophagus some but not all inclusive. \par -Reasons include arousal consistent with hypopnea; respiratory events most prominent in REM sleep or supine position; therefore sleep staging and body position are important for accurate diagnosis and estimation of AHI. \par \par Problem 6: ?RLS \par -S/P to complete: iron studies, thyroid function test, free and total testosterone level. (WNL)\par \par - Restless Legs Syndrome (RLS), also known as Houser-Ekbom Disease, is a common sleep -related movement disorder. About 1 in 10 adults in the [place] have problems from restless leg syndrome. It also can be seen in about 2% of children. Women are twice as likely as men to have RLS. People with RLS will have symptoms most often during times when they are less active, especially at bedtime. RLS most often causes an overwhelming urge to move your legs and sometimes other parts of your body. This urge is associated with unpleasant sensations in different parts of th body. The symptoms can be mild to severe and can affect your ability to go to sleep and stay asleep. People with RLS often sleep less at night and feel more tired during the day. \par \par Problem 7: Anxiety \par -recommended to read "The Gift of Maybe" by Brittney Abad \par \par Problem 8: Overweight - IMPROVED\par - Weight loss, exercise, and diet control were discussed and are highly encouraged. Treatment options were given such as, aqua therapy, and contacting a nutritionist. Recommended to use the elliptical, stationary bike, less use of treadmill. Mindful eating was explained to the patient Obesity is associated with worsening asthma, shortness of breath, and potential for cardiac disease, diabetes, and other underlying medical conditions.\par \par Problem 9: Poor Breathing Mechanics\par - Proper breathing techniques were reviewed with an emphasis of exhalation. Patient instructed to breath in for 1 second and out for four seconds. Patient was encouraged to not talk while walking.\par \par Problem 10: Health maintenance\par -s/p 1st Covid-19 vaccine Pfizer)\par - recommended Quercetin 2000 mg \par - recommended SPM \par - recommended liposomal Glutathione 500 mg BID \par -s/p flu shot - 2020\par -recommended strep pneumonia vaccines: Prevnar-13 vaccine, followed by Pneumo vaccine 23 one year following (after the age of 65)\par -recommended early intervention for URIs\par -recommended regular osteoporosis evaluations\par -recommended early dermatological evaluations\par -recommended after the age of 50 to the age of 70, colonoscopy every 5 years\par \par f/u in 6-8 weeks\par pt is encouraged to call or fax the office with any questions or concerns.

## 2021-03-15 NOTE — REVIEW OF SYSTEMS
[Eye Irritation] : eye irritation [Dyspnea] : dyspnea [SOB on Exertion] : sob on exertion [Negative] : Endocrine [Wheezing] : no wheezing [Chest Discomfort] : no chest discomfort [GERD] : no gerd [Diarrhea] : no diarrhea [Constipation] : no constipation [Dysphagia] : no dysphagia [Dizziness] : no dizziness

## 2021-03-15 NOTE — REASON FOR VISIT
[Follow-Up] : a follow-up visit [TextBox_44] : video call - COVID-19 Induced Bronchospasm, s/p COVID-19 Pneumonitis, Allergies, GERD, ?JIE, ?RLS

## 2021-03-18 ENCOUNTER — TRANSCRIPTION ENCOUNTER (OUTPATIENT)
Age: 36
End: 2021-03-18

## 2021-03-19 ENCOUNTER — TRANSCRIPTION ENCOUNTER (OUTPATIENT)
Age: 36
End: 2021-03-19

## 2021-04-05 ENCOUNTER — TRANSCRIPTION ENCOUNTER (OUTPATIENT)
Age: 36
End: 2021-04-05

## 2021-04-05 ENCOUNTER — APPOINTMENT (OUTPATIENT)
Dept: NEUROLOGY | Facility: CLINIC | Age: 36
End: 2021-04-05

## 2021-04-08 ENCOUNTER — TRANSCRIPTION ENCOUNTER (OUTPATIENT)
Age: 36
End: 2021-04-08

## 2021-04-09 ENCOUNTER — TRANSCRIPTION ENCOUNTER (OUTPATIENT)
Age: 36
End: 2021-04-09

## 2021-06-18 ENCOUNTER — APPOINTMENT (OUTPATIENT)
Dept: PULMONOLOGY | Facility: CLINIC | Age: 36
End: 2021-06-18
Payer: COMMERCIAL

## 2021-06-18 VITALS
HEIGHT: 77 IN | TEMPERATURE: 97.9 F | SYSTOLIC BLOOD PRESSURE: 118 MMHG | WEIGHT: 213 LBS | HEART RATE: 64 BPM | RESPIRATION RATE: 17 BRPM | BODY MASS INDEX: 25.15 KG/M2 | OXYGEN SATURATION: 98 % | DIASTOLIC BLOOD PRESSURE: 70 MMHG

## 2021-06-18 PROCEDURE — 99072 ADDL SUPL MATRL&STAF TM PHE: CPT

## 2021-06-18 PROCEDURE — 99214 OFFICE O/P EST MOD 30 MIN: CPT | Mod: 25

## 2021-06-18 PROCEDURE — ZZZZZ: CPT

## 2021-06-18 PROCEDURE — 94010 BREATHING CAPACITY TEST: CPT

## 2021-06-18 PROCEDURE — 95012 NITRIC OXIDE EXP GAS DETER: CPT

## 2021-06-18 PROCEDURE — 94729 DIFFUSING CAPACITY: CPT

## 2021-06-18 PROCEDURE — 94727 GAS DIL/WSHOT DETER LNG VOL: CPT

## 2021-06-18 NOTE — ADDENDUM
[FreeTextEntry1] : Documented by Batool Mcclendon acting as a scribe for Dr. Anderson Aly on 06/18/2021 \par \par All medical record entries made by the Scribe were at my, Dr. Anderson Aly's, direction and personally dictated by me on 06/18/2021 . I have reviewed the chart and agree that the record accurately reflects my personal performance of the history, physical exam, assessment and plan. I have also personally directed, reviewed, and agree with the discharge instructions.

## 2021-06-18 NOTE — HISTORY OF PRESENT ILLNESS
[TextBox_4] : Mr. BILLY TUCKER is a 35 year old male presenting to the office  for a follow up evaluation. His chief complaint is\par - energy level has been okay but he notes he has some flair ups, he will be fine for a week or two and then he will go through a period of low energy \par - no fevers / chills / sweats \par - he notes he has some chest pressure / pain - he feels his reflux has been coming back. \par - he has been waking up a lot in the middle of the night due to chest pressure so he has been taking famotidine. \par - he has been exercising, running about 2-3 days a week about 2-3 miles each time. \par - vision has been okay but at the end of the day its blurred, probably because of dryness\par - no coughing / wheezing except for a few flair up moments, hasn’t happened for awhile \par - his sleep pattern has been bad \par - has been taking a probiotic, Quercitin, vitamin D and B, and Elderberry \par \par - He  denies any visual issues, headaches, nausea, vomiting, fever, chills, sweats, chest pains, chest pressure, diarrhea, constipation, dysphagia, myalgia, dizziness, leg swelling, leg pain, itchy eyes, itchy ears, heartburn, reflux, or sour taste in the mouth.\par

## 2021-06-18 NOTE — REASON FOR VISIT
[Follow-Up] : a follow-up visit [TextBox_44] :  COVID-19 Induced Bronchospasm, s/p COVID-19 Pneumonitis, Allergies, GERD, ?JIE, ?RLS

## 2021-06-18 NOTE — PROCEDURE
[FreeTextEntry1] : FULL PFTs reveals normal flows; FEV1 was  5.96 L which is   108% of predicted; normal lung volumes; normal diffusion of  50.8, which is  150% of predicted; normal flow volume loop \par \par  FENO was 39 ; normal value being less than 25\par Fractional exhaled nitric oxide (FENO) is regarded as a simple, noninvasive method for assessing eosinophilic airway inflammation. Produced by a variety of cells within the lung, nitric oxide (NO) concentrations are generally low in healthy individuals. However, high concentrations of NO appear to be involved in nonspecific host defense mechanisms and chronic inflammatory diseases such as asthma. The American Thoracic Society (ATS) therefore has recommended using FENO to aid in the diagnosis and monitoring of eosinophilic airway inflammation and asthma, and for identifying steroid responsive individuals whose chronic respiratory symptoms may be airway inflammation.

## 2021-07-06 ENCOUNTER — TRANSCRIPTION ENCOUNTER (OUTPATIENT)
Age: 36
End: 2021-07-06

## 2021-07-30 ENCOUNTER — APPOINTMENT (OUTPATIENT)
Dept: PULMONOLOGY | Facility: CLINIC | Age: 36
End: 2021-07-30
Payer: COMMERCIAL

## 2021-07-30 VITALS
WEIGHT: 208 LBS | RESPIRATION RATE: 17 BRPM | TEMPERATURE: 97.5 F | SYSTOLIC BLOOD PRESSURE: 120 MMHG | BODY MASS INDEX: 24.56 KG/M2 | DIASTOLIC BLOOD PRESSURE: 70 MMHG | OXYGEN SATURATION: 98 % | HEART RATE: 54 BPM | HEIGHT: 77 IN

## 2021-07-30 PROCEDURE — 99214 OFFICE O/P EST MOD 30 MIN: CPT

## 2021-07-30 NOTE — ASSESSMENT
[FreeTextEntry1] : Mr. BILLY TUCKER is a 36 year old male who has a history of GERD, allergies, anxiety, anal fissure, s/p COVID-19 who now presents to the office for an acute pulmonary evaluation. His chief concern is chest tightness that causes SOB. \par \par 1. RADs:\par - Add Atrovent as rescue inhaler as patient is intolerant to Albuterol.\par - Add Trelagy 100 mcg; 1 inhale QD rinse and gargle post use.\par - Discussed PRN v. Maintenance medications and benefits of use.\par \par 2. GERD:\par - Add Omeprazole 40 mg PO QAM\par - Continue Famotidine 40 mg PO @ bedtime.\par \par Patient to follow up with Dr. Aly as scheduled.\par Patient to call with further questions and concerns.\par Patient verbalizes understanding of care plan and is agreeable.\par \par \par

## 2021-07-30 NOTE — REVIEW OF SYSTEMS
[Cough] : no cough [Chest Tightness] : chest tightness [Sputum] : no sputum [Dyspnea] : dyspnea [Wheezing] : wheezing [GERD] : gerd [Anxiety] : anxiety [Negative] : Endocrine

## 2021-07-30 NOTE — HISTORY OF PRESENT ILLNESS
[TextBox_4] : Mr. BILLY TUCKER is a 36 year old male who has a history of GERD, allergies, anxiety, anal fissure, s/p COVID-19 who now presents to the office for an acute pulmonary evaluation. \par \par His chief concern is chest tightness that causes SOB. \par \par Patient states these episodes have been happening since his Covid 19 infection in 3/2020.  He states he tolerates swimming and feels no symptoms during activity, but will feel symptom onset 1-2 days later.  He states he feels as though "someone squeezing my lungs" and then he will need to work harder to breathe because of it. He notes symptoms did improve in April and he discontinued Trelagy at that time because of it.  He notes he has tried Albuterol and Levalbuterol, but has a reaction where he feels his throat gets inflamed and then causes he breathing to worsen.  He notes he does not have a rescue medication because of this.  \par \par He states he called to schedule an appointment yesterday morning where he feels phlegmy and congested, but notes he feels better today. Patient does admit to indigestion, he is on famotidine, but symptom persists. \par \par He has upcoming visit with Cardiologist on Monday to evaluate if symptoms are being caused by underlying cardiac issue. \par \par

## 2021-08-17 ENCOUNTER — TRANSCRIPTION ENCOUNTER (OUTPATIENT)
Age: 36
End: 2021-08-17

## 2021-10-18 ENCOUNTER — APPOINTMENT (OUTPATIENT)
Dept: PULMONOLOGY | Facility: CLINIC | Age: 36
End: 2021-10-18
Payer: COMMERCIAL

## 2021-10-18 VITALS
HEART RATE: 58 BPM | DIASTOLIC BLOOD PRESSURE: 70 MMHG | WEIGHT: 208 LBS | RESPIRATION RATE: 17 BRPM | OXYGEN SATURATION: 98 % | SYSTOLIC BLOOD PRESSURE: 120 MMHG | HEIGHT: 77 IN | BODY MASS INDEX: 24.56 KG/M2 | TEMPERATURE: 96.9 F

## 2021-10-18 DIAGNOSIS — Z23 ENCOUNTER FOR IMMUNIZATION: ICD-10-CM

## 2021-10-18 PROCEDURE — 90682 RIV4 VACC RECOMBINANT DNA IM: CPT

## 2021-10-18 PROCEDURE — G0008: CPT

## 2021-10-18 PROCEDURE — 99214 OFFICE O/P EST MOD 30 MIN: CPT | Mod: 25

## 2021-10-18 NOTE — ASSESSMENT
[FreeTextEntry1] : Mr. BILLY TUCKER is a 36 year old male who has a history of GERD, allergies, anxiety, anal fissure, s/p COVID-19 who now presents to the office  for pulmonary evaluation following COVID-19 infection in March 2020. - improved s/p Covid-19 vaccine 3/2021 - reflux / poor sleep -improved \par \par The patient's SOB is felt to be multifactorial:\par -pulmonary \par    -COVID-19 induced bronchospasm \par - Poor breathing mechanics (anxiety) \par - Overweight/ Out-of Shape\par - ?Cardiac Disease - no evidence \par \par \par Problem 1: COVID-19 Induced Bronchospasm (asthma) \par -continue Trelegy 100 1 inhalation QD \par -continue Xopenex (0.63) via nebulizer or xopenex HFA-TID\par -Gargle and Spit after use of inhaler \par -Full PFTs  - WNL\par \par -Inhaler technique reviewed as well as oral hygiene techniques reviewed with patient. Avoidance of cold air, extremes of temperature, rescue inhaler should be used before exercise. Order of medication reviewed with patient. Recommended use of a cool mist humidifier in the bedroom. \par \par Problem 2: s/p COVID-19 Pneumonitis (s/p Pfizer vaccine 3/2021)\par -Radiographically resolved \par - PFTs wnl \par \par Problem 3:Allergies and Sinus (+)\par -Flonase 1 sniff/nostril BID \par -Zyrtec 10mg qhs PRN \par - Environmental measures for allergies were encouraged including mattress and pillow cover, air purifier, and environmental controls.\par \par Problem 4:GERD\par  -add Pepcid 40 mg QHS \par - Things to avoid including overeating, spicy foods, tight clothing, eating within three hours of bed, this list is not all inclusive.\par - For treatment of reflux, possible options discussed including diet control, H2 blockers, PPIs, as well as coating motility agents discussed as treatment options. Timing of meals and proximity of last meal to sleep were discussed. If symptoms persist, a formal gastrointestinal evaluation is needed. \par \par Problem 5: No JIE\par -s/p Home Sleep Study. (-)\par -Sleep apnea is associated with adverse clinical consequences which an affect most organ systems. Cardiovascular disease risk includes arrhythmias, atrial fibrillation, hypertension, coronary artery disease, and stroke. Metabolic disorders include diabetes type 2, non-alcoholic fatty liver disease. Mood disorder especially depression; and cognitive decline especially in the elderly. Associations with chronic reflux/Meadows’s esophagus some but not all inclusive. \par -Reasons include arousal consistent with hypopnea; respiratory events most prominent in REM sleep or supine position; therefore sleep staging and body position are important for accurate diagnosis and estimation of AHI. \par \par Problem 6: ?RLS \par -S/P to complete: iron studies, thyroid function test, free and total testosterone level. (WNL)\par \par - Restless Legs Syndrome (RLS), also known as Houser-Ekbom Disease, is a common sleep -related movement disorder. About 1 in 10 adults in the [place] have problems from restless leg syndrome. It also can be seen in about 2% of children. Women are twice as likely as men to have RLS. People with RLS will have symptoms most often during times when they are less active, especially at bedtime. RLS most often causes an overwhelming urge to move your legs and sometimes other parts of your body. This urge is associated with unpleasant sensations in different parts of th body. The symptoms can be mild to severe and can affect your ability to go to sleep and stay asleep. People with RLS often sleep less at night and feel more tired during the day. \par \par Problem 7: Anxiety \par -recommended to read "The Gift of Maybe" by Brittney Abad \par \par Problem 8: Overweight - IMPROVED- NOOM\par -recommended "MUNIQ" OTC supplement \par -recommended Mitovive \par - Weight loss, exercise, and diet control were discussed and are highly encouraged. Treatment options were given such as, aqua therapy, and contacting a nutritionist. Recommended to use the elliptical, stationary bike, less use of treadmill. Mindful eating was explained to the patient Obesity is associated with worsening asthma, shortness of breath, and potential for cardiac disease, diabetes, and other underlying medical conditions.\par \par Problem 9: Poor Breathing Mechanics\par - Proper breathing techniques were reviewed with an emphasis of exhalation. Patient instructed to breath in for 1 second and out for four seconds. Patient was encouraged to not talk while walking.\par \par Problem 10: Health maintenance\par -s/p  Covid-19 vaccine Pfizer x2 \par - recommended Quercetin 2000 mg \par - recommended SPM \par - recommended liposomal Glutathione 500 mg BID \par -s/p flu shot - 2021\par -recommended strep pneumonia vaccines: Prevnar-13 vaccine, followed by Pneumo vaccine 23 one year following (after the age of 65)\par -recommended early intervention for URIs\par -recommended regular osteoporosis evaluations\par -recommended early dermatological evaluations\par -recommended after the age of 50 to the age of 70, colonoscopy every 5 years\par \par f/u in 6-8 weeks\par pt is encouraged to call or fax the office with any questions or concerns.

## 2021-10-18 NOTE — ADDENDUM
[FreeTextEntry1] : Documented by Vicky Mercado acting as a scribe for Dr. Anderson Aly on 10/18/2021 \par \par All medical record entries made by the Scribe were at my, Dr. Anderson Aly's, direction and personally dictated by me on 10/18/2021 . I have reviewed the chart and agree that the record accurately reflects my personal performance of the history, physical exam, assessment and plan. I have also personally directed, reviewed, and agree with the discharge instructions

## 2021-10-18 NOTE — PHYSICAL EXAM
[No Acute Distress] : no acute distress [Normal Oropharynx] : normal oropharynx [II] : Mallampati Class: II [No Neck Mass] : no neck mass [Normal Appearance] : normal appearance [Normal Rate/Rhythm] : normal rate/rhythm [Normal S1, S2] : normal s1, s2 [No Murmurs] : no murmurs [No Resp Distress] : no resp distress [Clear to Auscultation Bilaterally] : clear to auscultation bilaterally [No Abnormalities] : no abnormalities [Benign] : benign [Normal Gait] : normal gait [No Clubbing] : no clubbing [No Cyanosis] : no cyanosis [No Edema] : no edema [FROM] : FROM [Normal Color/ Pigmentation] : normal color/ pigmentation [No Focal Deficits] : no focal deficits [Oriented x3] : oriented x3 [Normal Affect] : normal affect [TextBox_68] : I:E 1:3, clear

## 2021-10-18 NOTE — HISTORY OF PRESENT ILLNESS
[TextBox_4] : Mr. BILLY TUCKER is a 35 year old male presenting to the office  for a follow up evaluation. His chief complaint is\par \par -he notes generally feeling good \par -he notes issues with seasonal transition from summer to fall but resolved\par -he notes increasing exercise which caused 10 lb lost \par -he notes chest pressure and tightness is almost resolved\par -he notes fatigue and he needs to lie down and sleep to resolve  \par -he notes running and walking for exercise and looking to resume swimming\par -he notes use of NOOM for diet \par -he notes dry eyes\par \par - He  denies any visual issues, headaches, nausea, vomiting, fever, chills, sweats,  diarrhea, constipation, dysphagia, myalgia, dizziness, leg swelling, leg pain, itchy eyes, itchy ears, heartburn, reflux, or sour taste in the mouth.

## 2021-11-27 NOTE — ASSESSMENT
[FreeTextEntry1] : Mr. BILLY TUCKER is a 35 year old male who has a history of GERD, allergies, anxiety, anal fissure, s/p COVID-19 who now presents to the office  for pulmonary evaluation following COVID-19 infection in March 2020. - improved s/p Covid-19 vaccine 3/2021 - recent reflux / poor sleep \par \par The patient's SOB is felt to be multifactorial:\par -pulmonary \par    -COVID-19 induced bronchospasm \par - Poor breathing mechanics (anxiety) \par - Overweight/ Out-of Shape\par - ?Cardiac Disease - no evidence \par \par \par Problem 1: COVID-19 Induced Bronchospasm (asthma) \par -continue Trelegy 100 1 inhalation QD \par -continue Xopenex (0.63) via nebulizer or xopenex HFA-TID\par -Gargle and Spit after use of inhaler \par -Full PFTs  - WNL\par \par -Inhaler technique reviewed as well as oral hygiene techniques reviewed with patient. Avoidance of cold air, extremes of temperature, rescue inhaler should be used before exercise. Order of medication reviewed with patient. Recommended use of a cool mist humidifier in the bedroom. \par \par Problem 2: s/p COVID-19 Pneumonitis (s/p Pfizer vaccine 3/2021)\par -Radiographically resolved \par - PFTs wnl \par \par Problem 3:Allergies and Sinus (+)\par -Flonase 1 sniff/nostril BID \par -Zyrtec 10mg qhs PRN \par - Environmental measures for allergies were encouraged including mattress and pillow cover, air purifier, and environmental controls.\par \par Problem 4:GERD\par  -add Pepcid 40 mg QHS \par - Things to avoid including overeating, spicy foods, tight clothing, eating within three hours of bed, this list is not all inclusive.\par - For treatment of reflux, possible options discussed including diet control, H2 blockers, PPIs, as well as coating motility agents discussed as treatment options. Timing of meals and proximity of last meal to sleep were discussed. If symptoms persist, a formal gastrointestinal evaluation is needed. \par \par Problem 5: No JIE\par -s/p Home Sleep Study. (-)\par -Sleep apnea is associated with adverse clinical consequences which an affect most organ systems. Cardiovascular disease risk includes arrhythmias, atrial fibrillation, hypertension, coronary artery disease, and stroke. Metabolic disorders include diabetes type 2, non-alcoholic fatty liver disease. Mood disorder especially depression; and cognitive decline especially in the elderly. Associations with chronic reflux/Meadows’s esophagus some but not all inclusive. \par -Reasons include arousal consistent with hypopnea; respiratory events most prominent in REM sleep or supine position; therefore sleep staging and body position are important for accurate diagnosis and estimation of AHI. \par \par Problem 6: ?RLS \par -S/P to complete: iron studies, thyroid function test, free and total testosterone level. (WNL)\par \par - Restless Legs Syndrome (RLS), also known as Houser-Ekbom Disease, is a common sleep -related movement disorder. About 1 in 10 adults in the [place] have problems from restless leg syndrome. It also can be seen in about 2% of children. Women are twice as likely as men to have RLS. People with RLS will have symptoms most often during times when they are less active, especially at bedtime. RLS most often causes an overwhelming urge to move your legs and sometimes other parts of your body. This urge is associated with unpleasant sensations in different parts of th body. The symptoms can be mild to severe and can affect your ability to go to sleep and stay asleep. People with RLS often sleep less at night and feel more tired during the day. \par \par Problem 7: Anxiety \par -recommended to read "The Gift of Maybe" by Brittney Abad \par \par Problem 8: Overweight - IMPROVED\par - Weight loss, exercise, and diet control were discussed and are highly encouraged. Treatment options were given such as, aqua therapy, and contacting a nutritionist. Recommended to use the elliptical, stationary bike, less use of treadmill. Mindful eating was explained to the patient Obesity is associated with worsening asthma, shortness of breath, and potential for cardiac disease, diabetes, and other underlying medical conditions.\par \par Problem 9: Poor Breathing Mechanics\par - Proper breathing techniques were reviewed with an emphasis of exhalation. Patient instructed to breath in for 1 second and out for four seconds. Patient was encouraged to not talk while walking.\par \par Problem 10: Health maintenance\par -s/p  Covid-19 vaccine Pfizer x2 \par - recommended Quercetin 2000 mg \par - recommended SPM \par - recommended liposomal Glutathione 500 mg BID \par -s/p flu shot - 2020\par -recommended strep pneumonia vaccines: Prevnar-13 vaccine, followed by Pneumo vaccine 23 one year following (after the age of 65)\par -recommended early intervention for URIs\par -recommended regular osteoporosis evaluations\par -recommended early dermatological evaluations\par -recommended after the age of 50 to the age of 70, colonoscopy every 5 years\par \par f/u in 6-8 weeks\par pt is encouraged to call or fax the office with any questions or concerns. 
N/A

## 2022-01-19 ENCOUNTER — TRANSCRIPTION ENCOUNTER (OUTPATIENT)
Age: 37
End: 2022-01-19

## 2022-02-14 ENCOUNTER — APPOINTMENT (OUTPATIENT)
Dept: PULMONOLOGY | Facility: CLINIC | Age: 37
End: 2022-02-14

## 2022-03-04 ENCOUNTER — TRANSCRIPTION ENCOUNTER (OUTPATIENT)
Age: 37
End: 2022-03-04

## 2022-03-16 ENCOUNTER — APPOINTMENT (OUTPATIENT)
Dept: ORTHOPEDIC SURGERY | Facility: CLINIC | Age: 37
End: 2022-03-16
Payer: COMMERCIAL

## 2022-03-16 VITALS
HEIGHT: 77 IN | WEIGHT: 204 LBS | BODY MASS INDEX: 24.09 KG/M2 | OXYGEN SATURATION: 99 % | HEART RATE: 57 BPM | DIASTOLIC BLOOD PRESSURE: 68 MMHG | SYSTOLIC BLOOD PRESSURE: 110 MMHG

## 2022-03-16 DIAGNOSIS — M65.321 TRIGGER FINGER, RIGHT INDEX FINGER: ICD-10-CM

## 2022-03-16 PROCEDURE — 73140 X-RAY EXAM OF FINGER(S): CPT | Mod: F6

## 2022-03-16 PROCEDURE — 99204 OFFICE O/P NEW MOD 45 MIN: CPT

## 2022-03-24 ENCOUNTER — APPOINTMENT (OUTPATIENT)
Dept: ORTHOPEDIC SURGERY | Facility: CLINIC | Age: 37
End: 2022-03-24

## 2022-03-25 ENCOUNTER — NON-APPOINTMENT (OUTPATIENT)
Age: 37
End: 2022-03-25

## 2022-03-25 ENCOUNTER — APPOINTMENT (OUTPATIENT)
Dept: OTOLARYNGOLOGY | Facility: CLINIC | Age: 37
End: 2022-03-25
Payer: COMMERCIAL

## 2022-03-25 VITALS
TEMPERATURE: 98.1 F | DIASTOLIC BLOOD PRESSURE: 64 MMHG | SYSTOLIC BLOOD PRESSURE: 101 MMHG | HEART RATE: 53 BPM | HEIGHT: 77 IN | WEIGHT: 200 LBS | BODY MASS INDEX: 23.62 KG/M2

## 2022-03-25 DIAGNOSIS — J34.3 HYPERTROPHY OF NASAL TURBINATES: ICD-10-CM

## 2022-03-25 DIAGNOSIS — H61.23 IMPACTED CERUMEN, BILATERAL: ICD-10-CM

## 2022-03-25 DIAGNOSIS — R09.81 NASAL CONGESTION: ICD-10-CM

## 2022-03-25 DIAGNOSIS — R13.12 DYSPHAGIA, OROPHARYNGEAL PHASE: ICD-10-CM

## 2022-03-25 PROCEDURE — 31231 NASAL ENDOSCOPY DX: CPT

## 2022-03-25 PROCEDURE — 99204 OFFICE O/P NEW MOD 45 MIN: CPT | Mod: 25

## 2022-03-25 PROCEDURE — 69210 REMOVE IMPACTED EAR WAX UNI: CPT

## 2022-03-25 NOTE — PHYSICAL EXAM
[Midline] : trachea located in midline position [Normal] : no rashes [de-identified] : b/l cerumen impaction

## 2022-03-25 NOTE — PROCEDURE
[FreeTextEntry3] : Procedure - Cerumen Removal. \par After informed verbal consent is obtained, cerumen is removed from the b/l ear canal with a curette, hydrogen peroxide and suction.  Normal appearing canal following removal.\par  [FreeTextEntry6] : Flexible scope #27 was used. Right nasal passage with hypertrophy of inferior, middle and superior turbinates. Nasal passage patent with clear middle meatus and sphenoethmoid recess. Left nasal passage with hypertrophy of  inferior, middle and superior turbinates. Nasal passage was patent with clear middle meatus and sphenoethmoid recess. No mucopurulence or polyps appreciated. Nasopharynx clear.\par \par  [de-identified] : Flexible scope #27 used. Passed through nasal passage and nasopharynx/oropharynx/hypopharynx clear. Supraglottis normal. Glottis with fully mobile vocal cords without lesions or masses. Visualized subglottis clear. Postcricoid area with erythema and edema interarytenoid. No pooling of secretions.  No evidence of thrush. \par \par

## 2022-03-25 NOTE — CONSULT LETTER
[Dear  ___] : Dear  [unfilled], [Consult Letter:] : I had the pleasure of evaluating your patient, [unfilled]. [Please see my note below.] : Please see my note below. [Consult Closing:] : Thank you very much for allowing me to participate in the care of this patient.  If you have any questions, please do not hesitate to contact me. [Sincerely,] : Sincerely, [DrRadhika  ___] : Dr. TRUONG [DrRadhika ___] : Dr. TRUONG [FreeTextEntry2] : Chris Kraft\par [address], [facility] [place] [address]\par  [phone]\par \par Aliya Tompkins\par [address], [place] [address]\par  [phone] [FreeTextEntry3] : Anna Andre MD\par Otolaryngology and Cranial Base Surgery\par Attending Physician - Department of Otolaryngology and Head & Neck Surgery\par [facility]\par  - [employer] [facility]\par Office: [phone]\par Fax: [phone]\par

## 2022-03-25 NOTE — ASSESSMENT
[FreeTextEntry1] : Dysphagia and feeling of throat swelling and food getting stuck, Pos LPRD\par - No evidence of thrush\par - will Get MBS and call with results - If normal, symptoms likely due to LPRD\par - F/U with GI for results of Endoscopy Bx's and dysphagia\par - Will hold off on PPI due to intolerance\par - Continue Famotine. \par - Reflux precautions\par \par nasal congestion, allergic rhinitis, ITH:\par - Start Azelastine 1-2x daily\par - Re-start Flonase 1-2x daily\par - Sinus wash daily to every other day\par - Continue to F/U with Allergist. \par \par -F/U PRN\par \par \par I personally saw and examined Mr. BILLY TUCKER in detail this visit today. I personally reviewed the HPI, PMH, FH, SH, ROS and medications/allergies. I have spoken to JENNIFER Owens regarding the history and have personally determined the assessment and plan of care, and documented this myself. I was present and participated in all key portions of the encounter and all procedures noted above. I have made changes in the body of the note where appropriate.\par \par Attesting Faculty: See Attending Signature Below

## 2022-04-01 ENCOUNTER — TRANSCRIPTION ENCOUNTER (OUTPATIENT)
Age: 37
End: 2022-04-01

## 2022-04-07 ENCOUNTER — APPOINTMENT (OUTPATIENT)
Dept: PULMONOLOGY | Facility: CLINIC | Age: 37
End: 2022-04-07
Payer: COMMERCIAL

## 2022-04-07 PROCEDURE — 99214 OFFICE O/P EST MOD 30 MIN: CPT | Mod: 95

## 2022-04-07 NOTE — HISTORY OF PRESENT ILLNESS
[Home] : at home, [unfilled] , at the time of the visit. [Medical Office: ([facility])___] : at the medical office located in  [Verbal consent obtained from patient] : the patient, [unfilled] [TextBox_4] : Mr. BILLY TUCKER is a 35 year old male presenting to the office via video call for a follow up evaluation. His chief complaint is\par \par -he notes having COVID-19 in January 2022\par -he notes having cold-like symptoms\par -he notes feeling short of breath when walking or running\par -he notes having trouble swallowing food\par -he notes having an endoscopy done\par -he notes taking Nystatin\par -he notes that he has a rash\par -he notes having headaches when he has a food reaction\par -he notes seeing an allergist who said he is having an oral allergy syndrome\par -he notes not being able to catch his breath after walking a mile\par -he notes taking Zyrtec and Benadryl for his rash\par -he notes taking Pepcid \par -he notes he is taking Flonase for post-nasal drip\par -he notes taking Vitamin D and C supplements\par -he notes he has run out of Trelegy \par \par patient denies any nausea, vomiting, fever, chills, sweats, chest pain, chest pressure, palpitations, coughing, wheezing, fatigue, diarrhea, constipation, dysphagia, myalgias, dizziness, leg swelling, leg pain, itchy eyes, itchy ears, heartburn, reflux or sour taste in the mouth

## 2022-04-07 NOTE — ADDENDUM
[FreeTextEntry1] : Documented by Zackary Hillman acting as a scribe for Dr. Anderson Aly on 04/07/2022 .\par \par All medical record entries made by the Scribe were at my, Dr. Anderson Aly's, direction and personally dictated by me on. I have reviewed the chart and agree that the record accurately reflects my personal performance of the history, physical exam, assessment and plan. I have also personally directed, reviewed, and agree with the discharge instructions.

## 2022-04-07 NOTE — ASSESSMENT
[FreeTextEntry1] : Mr. BILLY TUCKER is a 36 year old male who has a history of GERD, allergies, anxiety, anal fissure, s/p COVID-19 who now presents to the office via video call  for pulmonary evaluation following COVID-19 infection in March 2020. - improved s/p Covid-19 vaccine 3/2021 - reflux / poor sleep -improved; now post COVID-19 (1/2022) - consistent with long COVID- breathing/ oral allergy Sx \par \par The patient's SOB is felt to be multifactorial:\par -pulmonary \par    -COVID-19 induced bronchospasm \par - Poor breathing mechanics (anxiety) \par - Overweight/ Out-of Shape\par - ?Cardiac Disease - no evidence \par \par \par Problem 1: COVID-19 Induced Bronchospasm (asthma) \par -continue Trelegy 100 1 inhalation QD or  add Breztri 2 BID \par -continue Xopenex (0.63) via nebulizer or xopenex HFA-TID\par -Gargle and Spit after use of inhaler \par -Full PFTs  - WNL\par \par Problem 1A: COVID-19 (long haul)\par -recommend Allegra 180mg qD\par -recommend Benadryl 25mg qHs\par -recommend Zyrtec 10 qHs\par -recommended low histamine diet\par \par Problem 1B: Allergic Reaction\par  -add Prednisone 20 mg x 7 days, 10 mg x 7 days \par -Information sheet given to the patient to be reviewed, this medication is never to be used without consulting the prescribing physician. Proper dietary restraint is necessary specifically salt containing foods, if any reaction may occur should be reported. \par -Inhaler technique reviewed as well as oral hygiene techniques reviewed with patient. Avoidance of cold air, extremes of temperature, rescue inhaler should be used before exercise. Order of medication reviewed with patient. Recommended use of a cool mist humidifier in the bedroom. \par \par Problem 2: s/p COVID-19 Pneumonitis (s/p Pfizer vaccine 3/2021)\par -Radiographically resolved \par - PFTs wnl \par \par Problem 3:Allergies and Sinus (+)\par -Flonase 1 sniff/nostril BID \par -Zyrtec 10mg qhs PRN \par - Environmental measures for allergies were encouraged including mattress and pillow cover, air purifier, and environmental controls.\par \par Problem 4:GERD\par  -add Pepcid 40 mg BID\par - Things to avoid including overeating, spicy foods, tight clothing, eating within three hours of bed, this list is not all inclusive.\par - For treatment of reflux, possible options discussed including diet control, H2 blockers, PPIs, as well as coating motility agents discussed as treatment options. Timing of meals and proximity of last meal to sleep were discussed. If symptoms persist, a formal gastrointestinal evaluation is needed. \par \par Problem 5: No JIE\par -s/p Home Sleep Study. (-)\par -Sleep apnea is associated with adverse clinical consequences which an affect most organ systems. Cardiovascular disease risk includes arrhythmias, atrial fibrillation, hypertension, coronary artery disease, and stroke. Metabolic disorders include diabetes type 2, non-alcoholic fatty liver disease. Mood disorder especially depression; and cognitive decline especially in the elderly. Associations with chronic reflux/Meadows’s esophagus some but not all inclusive. \par -Reasons include arousal consistent with hypopnea; respiratory events most prominent in REM sleep or supine position; therefore sleep staging and body position are important for accurate diagnosis and estimation of AHI. \par \par Problem 6: ?RLS \par -S/P to complete: iron studies, thyroid function test, free and total testosterone level. (WNL)\par \par - Restless Legs Syndrome (RLS), also known as Houser-Ekbom Disease, is a common sleep -related movement disorder. About 1 in 10 adults in the [place] have problems from restless leg syndrome. It also can be seen in about 2% of children. Women are twice as likely as men to have RLS. People with RLS will have symptoms most often during times when they are less active, especially at bedtime. RLS most often causes an overwhelming urge to move your legs and sometimes other parts of your body. This urge is associated with unpleasant sensations in different parts of th body. The symptoms can be mild to severe and can affect your ability to go to sleep and stay asleep. People with RLS often sleep less at night and feel more tired during the day. \par \par Problem 7: Anxiety \par -recommended to read "The Gift of Maybe" by Brittney Abad \par \par Problem 8: Overweight - IMPROVED- NOOM\par -recommended "MUNIQ" OTC supplement \par -recommended Mitovive \par - Weight loss, exercise, and diet control were discussed and are highly encouraged. Treatment options were given such as, aqua therapy, and contacting a nutritionist. Recommended to use the elliptical, stationary bike, less use of treadmill. Mindful eating was explained to the patient Obesity is associated with worsening asthma, shortness of breath, and potential for cardiac disease, diabetes, and other underlying medical conditions.\par \par Problem 9: Poor Breathing Mechanics\par - Proper breathing techniques were reviewed with an emphasis of exhalation. Patient instructed to breath in for 1 second and out for four seconds. Patient was encouraged to not talk while walking.\par \par Problem 10: Health maintenance\par -s/p  Covid-19 vaccine Pfizer x2 \par - recommended Quercetin 2000 mg \par - recommended SPM \par - recommended liposomal Glutathione 500 mg BID \par -s/p flu shot - 2021\par -recommended strep pneumonia vaccines: Prevnar-13 vaccine, followed by Pneumo vaccine 23 one year following (after the age of 65)\par -recommended early intervention for URIs\par -recommended regular osteoporosis evaluations\par -recommended early dermatological evaluations\par -recommended after the age of 50 to the age of 70, colonoscopy every 5 years\par \par f/u in 6-8 weeks\par pt is encouraged to call or fax the office with any questions or concerns.

## 2022-04-07 NOTE — REASON FOR VISIT
[Follow-Up] : a follow-up visit [TextBox_44] : video call- COVID-19 Induced Bronchospasm, s/p COVID-19 Pneumonitis, Allergies, GERD, ?JIE, ?RLS, COVID-19 (1/2022, 3/2020)

## 2022-04-28 ENCOUNTER — APPOINTMENT (OUTPATIENT)
Dept: SPEECH THERAPY | Facility: HOSPITAL | Age: 37
End: 2022-04-28

## 2022-04-28 ENCOUNTER — APPOINTMENT (OUTPATIENT)
Dept: RADIOLOGY | Facility: HOSPITAL | Age: 37
End: 2022-04-28

## 2022-05-12 ENCOUNTER — NON-APPOINTMENT (OUTPATIENT)
Age: 37
End: 2022-05-12

## 2022-06-03 ENCOUNTER — NON-APPOINTMENT (OUTPATIENT)
Age: 37
End: 2022-06-03

## 2022-06-03 ENCOUNTER — APPOINTMENT (OUTPATIENT)
Dept: PULMONOLOGY | Facility: CLINIC | Age: 37
End: 2022-06-03
Payer: COMMERCIAL

## 2022-06-03 VITALS
TEMPERATURE: 96 F | WEIGHT: 193 LBS | SYSTOLIC BLOOD PRESSURE: 116 MMHG | OXYGEN SATURATION: 98 % | HEIGHT: 77 IN | RESPIRATION RATE: 16 BRPM | HEART RATE: 52 BPM | DIASTOLIC BLOOD PRESSURE: 74 MMHG | BODY MASS INDEX: 22.79 KG/M2

## 2022-06-03 DIAGNOSIS — E66.3 OVERWEIGHT: ICD-10-CM

## 2022-06-03 PROCEDURE — 94010 BREATHING CAPACITY TEST: CPT

## 2022-06-03 PROCEDURE — 99214 OFFICE O/P EST MOD 30 MIN: CPT | Mod: 25

## 2022-06-03 PROCEDURE — 95012 NITRIC OXIDE EXP GAS DETER: CPT

## 2022-06-03 NOTE — ASSESSMENT
[FreeTextEntry1] : Mr. BILLY TUCKER is a 36 year old male who has a history of GERD, allergies, anxiety, anal fissure, s/p COVID-19 who now presents to the office for pulmonary evaluation following COVID-19 infection in March 2020. - improved s/p Covid-19 vaccine 3/2021 - reflux / poor sleep -improved; now post COVID-19 (1/2022) - consistent with s/p  long COVID- breathing/ oral allergy Sx - still having allergy symptoms \par \par The patient's SOB is felt to be multifactorial:\par -pulmonary \par    -COVID-19 induced bronchospasm \par - Poor breathing mechanics (anxiety) \par - Out-of Shape\par - ?Cardiac Disease - no evidence \par \par \par Problem 1: COVID-19 Induced Bronchospasm (asthma) \par -continue Trelegy 100 1 inhalation QD or  add Breztri 2 BID \par -continue Xopenex (0.63) via nebulizer or xopenex HFA-TID\par -Gargle and Spit after use of inhaler \par -Full PFTs  - WNL\par \par Problem 1A: COVID-19 (long haul)\par -recommend Allegra 180mg qD\par -recommend Benadryl 25mg qHs\par -recommend Zyrtec 10 qHs\par -recommended low histamine diet\par \par Problem 1B: Allergic Reaction\par  -s/p Prednisone 20 mg x 7 days, 10 mg x 7 days \par -Information sheet given to the patient to be reviewed, this medication is never to be used without consulting the prescribing physician. Proper dietary restraint is necessary specifically salt containing foods, if any reaction may occur should be reported. \par -Inhaler technique reviewed as well as oral hygiene techniques reviewed with patient. Avoidance of cold air, extremes of temperature, rescue inhaler should be used before exercise. Order of medication reviewed with patient. Recommended use of a cool mist humidifier in the bedroom. \par \par Problem 2: s/p COVID-19 Pneumonitis (s/p Pfizer vaccine 3/2021)\par -Radiographically resolved \par - PFTs wnl \par \par Problem 3:Allergies and Sinus (+)\par -Flonase 1 sniff/nostril BID \par -Zyrtec 10mg qhs PRN \par -add Allegra 180 mg QAM \par - Environmental measures for allergies were encouraged including mattress and pillow cover, air purifier, and environmental controls.\par \par Problem 4:GERD\par  -Pepcid 40 mg BID\par - Things to avoid including overeating, spicy foods, tight clothing, eating within three hours of bed, this list is not all inclusive.\par - For treatment of reflux, possible options discussed including diet control, H2 blockers, PPIs, as well as coating motility agents discussed as treatment options. Timing of meals and proximity of last meal to sleep were discussed. If symptoms persist, a formal gastrointestinal evaluation is needed. \par \par Problem 5: No JIE\par -s/p Home Sleep Study. (-)\par -Sleep apnea is associated with adverse clinical consequences which an affect most organ systems. Cardiovascular disease risk includes arrhythmias, atrial fibrillation, hypertension, coronary artery disease, and stroke. Metabolic disorders include diabetes type 2, non-alcoholic fatty liver disease. Mood disorder especially depression; and cognitive decline especially in the elderly. Associations with chronic reflux/Meadows’s esophagus some but not all inclusive. \par -Reasons include arousal consistent with hypopnea; respiratory events most prominent in REM sleep or supine position; therefore sleep staging and body position are important for accurate diagnosis and estimation of AHI. \par \par Problem 6: ?RLS \par -S/P to complete: iron studies, thyroid function test, free and total testosterone level. (WNL)\par \par - Restless Legs Syndrome (RLS), also known as Houser-Ekbom Disease, is a common sleep -related movement disorder. About 1 in 10 adults in the [place] have problems from restless leg syndrome. It also can be seen in about 2% of children. Women are twice as likely as men to have RLS. People with RLS will have symptoms most often during times when they are less active, especially at bedtime. RLS most often causes an overwhelming urge to move your legs and sometimes other parts of your body. This urge is associated with unpleasant sensations in different parts of th body. The symptoms can be mild to severe and can affect your ability to go to sleep and stay asleep. People with RLS often sleep less at night and feel more tired during the day. \par \par Problem 7: Anxiety \par -recommended to read "The Gift of Maybe" by Brittney Abad \par \par Problem 8: Overweight - IMPROVED- NOOM\par -recommended "MUNIQ" OTC supplement \par -recommended Mitovive \par - Weight loss, exercise, and diet control were discussed and are highly encouraged. Treatment options were given such as, aqua therapy, and contacting a nutritionist. Recommended to use the elliptical, stationary bike, less use of treadmill. Mindful eating was explained to the patient Obesity is associated with worsening asthma, shortness of breath, and potential for cardiac disease, diabetes, and other underlying medical conditions.\par \par Problem 9: Poor Breathing Mechanics\par -Recommended Wim Hof and Buteyko breathing techniques \par - Proper breathing techniques were reviewed with an emphasis of exhalation. Patient instructed to breath in for 1 second and out for four seconds. Patient was encouraged to not talk while walking.\par \par Problem 10: Health maintenance\par -s/p  Covid-19 vaccine Pfizer x3 \par - recommended Quercetin 2000 mg \par - recommended SPM \par - recommended liposomal Glutathione 500 mg BID \par -s/p flu shot - 2021\par -recommended strep pneumonia vaccines: Prevnar-13 vaccine, followed by Pneumo vaccine 23 one year following (after the age of 65)\par -recommended early intervention for URIs\par -recommended regular osteoporosis evaluations\par -recommended early dermatological evaluations\par -recommended after the age of 50 to the age of 70, colonoscopy every 5 years\par \par f/u in 6-8 weeks\par pt is encouraged to call or fax the office with any questions or concerns.

## 2022-06-03 NOTE — ADDENDUM
[FreeTextEntry1] : Documented by Batool Mcclendon acting as a scribe for Dr. Anderson Aly on 06/03/2022 \par \par All medical record entries made by the Scribe were at my, Dr. Anderson Aly's, direction and personally dictated by me on 06/03/2022 . I have reviewed the chart and agree that the record accurately reflects my personal performance of the history, physical exam, assessment and plan. I have also personally directed, reviewed, and agree with the discharge instructions.

## 2022-06-03 NOTE — REASON FOR VISIT
[Follow-Up] : a follow-up visit [TextBox_44] :  COVID-19 Induced Bronchospasm, s/p COVID-19 Pneumonitis, Allergies, GERD, ?JIE, ?RLS, COVID-19 (1/2022, 3/2020)

## 2022-06-03 NOTE — PROCEDURE
[FreeTextEntry1] : FENO was 34 ; normal value being less than 25\par Fractional exhaled nitric oxide (FENO) is regarded as a simple, noninvasive method for assessing eosinophilic airway inflammation. Produced by a variety of cells within the lung, nitric oxide (NO) concentrations are generally low in healthy individuals. However, high concentrations of NO appear to be involved in nonspecific host defense mechanisms and chronic inflammatory diseases such as asthma. The American Thoracic Society (ATS) therefore has recommended using FENO to aid in the diagnosis and monitoring of eosinophilic airway inflammation and asthma, and for identifying steroid responsive individuals whose chronic respiratory symptoms may be airway inflammation. \par \par PFT reveals normal flows, with an FEV1 of  5.45 L, which is  104% of predicted, with normal flow volume loop

## 2022-06-03 NOTE — HISTORY OF PRESENT ILLNESS
[TextBox_4] : Mr. BILLY TUCKER is a 35 year old male presenting to the office for a follow up evaluation. His chief complaint is\par - he notes right now he feels he is about 85-95% though he still has SOB and fatigued days. \par - he has been on a low histamine diet \par - his bowels have been fine now \par - right now is allergy season for him so he has some itchy eyes \par - his SOB has been better \par - has some SOB at the end of a run but its very minimal \par - he notes he has lost some weight at first it was purposeful but then when he had gotten sick he lost 10 lbs in a month \par - he has been running / swimming for exercise \par - he does not wake up super rested\par - he notes he does not snore \par - memory / concentration is good \par - he notes his diet is still strict though he is reintroducing some foods into his diet again\par - he has been using Flonase, zyrtec, and a nasal rinse for his allergies \par - He  denies any visual issues, headaches, nausea, vomiting, fever, chills, sweats, chest pains, chest pressure, diarrhea, constipation, dysphagia, myalgia, dizziness, leg swelling, leg pain, itchy eyes, itchy ears, heartburn, reflux, or sour taste in the mouth.\par

## 2022-08-09 ENCOUNTER — NON-APPOINTMENT (OUTPATIENT)
Age: 37
End: 2022-08-09

## 2022-10-07 ENCOUNTER — NON-APPOINTMENT (OUTPATIENT)
Age: 37
End: 2022-10-07

## 2022-10-07 ENCOUNTER — APPOINTMENT (OUTPATIENT)
Dept: PULMONOLOGY | Facility: CLINIC | Age: 37
End: 2022-10-07

## 2022-10-07 VITALS
HEART RATE: 60 BPM | TEMPERATURE: 97 F | DIASTOLIC BLOOD PRESSURE: 70 MMHG | RESPIRATION RATE: 16 BRPM | WEIGHT: 196 LBS | HEIGHT: 77 IN | OXYGEN SATURATION: 100 % | SYSTOLIC BLOOD PRESSURE: 110 MMHG | BODY MASS INDEX: 23.14 KG/M2

## 2022-10-07 PROCEDURE — 95012 NITRIC OXIDE EXP GAS DETER: CPT

## 2022-10-07 PROCEDURE — 99214 OFFICE O/P EST MOD 30 MIN: CPT | Mod: 25

## 2022-10-07 PROCEDURE — 94010 BREATHING CAPACITY TEST: CPT

## 2022-10-07 NOTE — ADDENDUM
[FreeTextEntry1] : Documented by JOYCE Kelly acting as a scribe for Dr. Anderson Aly on 10/07/2022 .\par All medical record entries made by the Scribe were at my, Dr. Anderson Aly's, direction and personally dictated by me on 10/07/2022. I have reviewed the chart and agree that the record accurately reflects my personal performance of the history, physical exam, assessment and plan. I have also personally directed, reviewed, and agree with the discharge instructions.

## 2022-10-07 NOTE — PHYSICAL EXAM
[No Acute Distress] : no acute distress [Normal Oropharynx] : normal oropharynx [Normal Appearance] : normal appearance [No Neck Mass] : no neck mass [Normal Rate/Rhythm] : normal rate/rhythm [Normal S1, S2] : normal s1, s2 [No Murmurs] : no murmurs [No Resp Distress] : no resp distress [Clear to Auscultation Bilaterally] : clear to auscultation bilaterally [No Abnormalities] : no abnormalities [Benign] : benign [Normal Gait] : normal gait [No Clubbing] : no clubbing [No Cyanosis] : no cyanosis [No Edema] : no edema [FROM] : FROM [No Focal Deficits] : no focal deficits [Normal Color/ Pigmentation] : normal color/ pigmentation [Oriented x3] : oriented x3 [Normal Affect] : normal affect [III] : Mallampati Class: III [TextBox_68] : I:E 1:3, clear

## 2022-10-07 NOTE — HISTORY OF PRESENT ILLNESS
[TextBox_4] : Mr. BILLY TUCKER is a 37 year old male presenting to the office for a follow up evaluation. His chief complaint is\par \par -he notes feeling generally well \par -he notes on low histamine diet which has improved condition significantly\par -he notes weight is stable \par -he notes good quality of sleep in general\par -he notes exercising (walking, lifting weights)\par -he notes no difficulties with season however, he had an asthma attack on a hot, humid day\par \par -he denies any headaches, nausea, vomiting, fever, chills, sweats, chest pain, chest pressure, coughing, wheezing, palpitations, constipation, diarrhea, dizziness, dysphagia, heartburn, reflux, itchy eyes, itchy ears, leg swelling, leg pain, arthralgias, myalgias, or sour taste in the mouth.

## 2022-10-07 NOTE — PROCEDURE
[FreeTextEntry1] : PFT reveals normal flows, with an FEV1 of  5.97L, which is 114% of predicted, with a normal flow volume loop. \par \par FENO was <5; a normal value being less than 25\par Fractional exhaled nitric oxide (FENO) is regarded as a simple, noninvasive method for assessing eosinophilic airway inflammation. Produced by a variety of cells within the lung, nitric oxide (NO) concentrations are generally low in healthy individuals. However, high concentrations of NO appear to be involved in nonspecific host defense mechanisms and chronic inflammatory diseases such as asthma. The American Thoracic Society (ATS) therefore has recommended using FENO to aid in the diagnosis and monitoring of eosinophilic airway inflammation and asthma, and for identifying steroid responsive individuals whose chronic respiratory symptoms may be caused by airway inflammation.

## 2022-10-07 NOTE — ASSESSMENT
[FreeTextEntry1] : Mr. BILLY TUCKER is a 37 year old male who has a history of GERD, allergies, anxiety, anal fissure, s/p COVID-19 who now presents to the office for pulmonary evaluation following COVID-19 infection in March 2020. - improved s/p Covid-19 vaccine 3/2021 - reflux / poor sleep -improved; now post COVID-19 (1/2022) - consistent with s/p  long COVID- breathing/ oral allergy Sx - still having mild allergy/ asthma sx\par \par The patient's SOB is felt to be multifactorial:\par -pulmonary \par    -COVID-19 induced bronchospasm \par - Poor breathing mechanics (anxiety) \par - Out-of Shape\par - ?Cardiac Disease - no evidence \par \par \par Problem 1: COVID-19 Induced Bronchospasm (asthma) \par -continue Trelegy 100 1 inhalation QD or  Breztri 2 BID \par -continue Xopenex (0.63) via nebulizer or xopenex HFA-TID\par -Gargle and Spit after use of inhaler \par -Full PFTs  - WNL\par \par Problem 1A: COVID-19 (long haul)\par -recommend Allegra 180mg qD\par -recommend Benadryl 25mg qHs\par -recommend Zyrtec 10 qHs\par -recommended low histamine diet\par \par Problem 1B: Allergic Reaction\par  -s/p Prednisone 20 mg x 7 days, 10 mg x 7 days \par -Information sheet given to the patient to be reviewed, this medication is never to be used without consulting the prescribing physician. Proper dietary restraint is necessary specifically salt containing foods, if any reaction may occur should be reported. \par -Inhaler technique reviewed as well as oral hygiene techniques reviewed with patient. Avoidance of cold air, extremes of temperature, rescue inhaler should be used before exercise. Order of medication reviewed with patient. Recommended use of a cool mist humidifier in the bedroom. \par \par Problem 2: s/p COVID-19 Pneumonitis (s/p Pfizer vaccine 3/2021)\par -Radiographically resolved \par - PFTs wnl \par \par Problem 3:Allergies and Sinus (+)\par -continue Flonase 1 sniff/nostril BID \par -continue Zyrtec 10mg qhs PRN \par -continue Allegra 180 mg QAM \par - Environmental measures for allergies were encouraged including mattress and pillow cover, air purifier, and environmental controls.\par \par Problem 4:GERD\par  -Pepcid 40 mg BID\par - Things to avoid including overeating, spicy foods, tight clothing, eating within three hours of bed, this list is not all inclusive.\par - For treatment of reflux, possible options discussed including diet control, H2 blockers, PPIs, as well as coating motility agents discussed as treatment options. Timing of meals and proximity of last meal to sleep were discussed. If symptoms persist, a formal gastrointestinal evaluation is needed. \par \par Problem 5: No JIE\par -s/p Home Sleep Study. (-)\par -Sleep apnea is associated with adverse clinical consequences which an affect most organ systems. Cardiovascular disease risk includes arrhythmias, atrial fibrillation, hypertension, coronary artery disease, and stroke. Metabolic disorders include diabetes type 2, non-alcoholic fatty liver disease. Mood disorder especially depression; and cognitive decline especially in the elderly. Associations with chronic reflux/Meadows’s esophagus some but not all inclusive. \par -Reasons include arousal consistent with hypopnea; respiratory events most prominent in REM sleep or supine position; therefore sleep staging and body position are important for accurate diagnosis and estimation of AHI. \par \par Problem 6: ?RLS \par -S/P to complete: iron studies, thyroid function test, free and total testosterone level. (WNL)\par \par - Restless Legs Syndrome (RLS), also known as Houser-Ekbom Disease, is a common sleep -related movement disorder. About 1 in 10 adults in the [place] have problems from restless leg syndrome. It also can be seen in about 2% of children. Women are twice as likely as men to have RLS. People with RLS will have symptoms most often during times when they are less active, especially at bedtime. RLS most often causes an overwhelming urge to move your legs and sometimes other parts of your body. This urge is associated with unpleasant sensations in different parts of th body. The symptoms can be mild to severe and can affect your ability to go to sleep and stay asleep. People with RLS often sleep less at night and feel more tired during the day. \par \par Problem 7: Anxiety \par -recommended to read "The Gift of Maybe" by Brittney Abad \par \par Problem 8: Overweight - IMPROVED- NOOM\par -recommended "MUNIQ" OTC supplement \par -recommended Mitovive \par - Weight loss, exercise, and diet control were discussed and are highly encouraged. Treatment options were given such as, aqua therapy, and contacting a nutritionist. Recommended to use the elliptical, stationary bike, less use of treadmill. Mindful eating was explained to the patient Obesity is associated with worsening asthma, shortness of breath, and potential for cardiac disease, diabetes, and other underlying medical conditions.\par \par Problem 9: Poor Breathing Mechanics\par -Recommended Wim Hof and Buteyko breathing techniques \par - Proper breathing techniques were reviewed with an emphasis of exhalation. Patient instructed to breath in for 1 second and out for four seconds. Patient was encouraged to not talk while walking.\par \par Problem 10: Health maintenance\par -recommended SaNOtize nasal spray \par -s/p  Covid-19 vaccine Pfizer x3 \par - recommended Quercetin 2000 mg \par - recommended SPM \par - recommended liposomal Glutathione 500 mg BID \par -s/p flu shot - 2021\par -recommended strep pneumonia vaccines: Prevnar-13 vaccine, followed by Pneumo vaccine 23 one year following (after the age of 65)\par -recommended early intervention for URIs\par -recommended regular osteoporosis evaluations\par -recommended early dermatological evaluations\par -recommended after the age of 50 to the age of 70, colonoscopy every 5 years\par \par f/u in 6-8 weeks\par pt is encouraged to call or fax the office with any questions or concerns.

## 2022-10-14 ENCOUNTER — APPOINTMENT (OUTPATIENT)
Dept: OBGYN | Facility: CLINIC | Age: 37
End: 2022-10-14

## 2022-10-14 DIAGNOSIS — Z23 ENCOUNTER FOR IMMUNIZATION: ICD-10-CM

## 2022-10-14 PROCEDURE — 90471 IMMUNIZATION ADMIN: CPT

## 2022-10-14 PROCEDURE — 90656 IIV3 VACC NO PRSV 0.5 ML IM: CPT

## 2022-10-24 ENCOUNTER — TRANSCRIPTION ENCOUNTER (OUTPATIENT)
Age: 37
End: 2022-10-24

## 2022-11-03 ENCOUNTER — TRANSCRIPTION ENCOUNTER (OUTPATIENT)
Age: 37
End: 2022-11-03

## 2022-11-04 ENCOUNTER — APPOINTMENT (OUTPATIENT)
Dept: SURGERY | Facility: CLINIC | Age: 37
End: 2022-11-04

## 2022-11-04 VITALS
SYSTOLIC BLOOD PRESSURE: 120 MMHG | TEMPERATURE: 97.8 F | OXYGEN SATURATION: 99 % | DIASTOLIC BLOOD PRESSURE: 78 MMHG | HEART RATE: 60 BPM | BODY MASS INDEX: 23.14 KG/M2 | WEIGHT: 196 LBS | HEIGHT: 77 IN | RESPIRATION RATE: 18 BRPM

## 2022-11-04 DIAGNOSIS — K60.0 ACUTE ANAL FISSURE: ICD-10-CM

## 2022-11-04 PROCEDURE — 99213 OFFICE O/P EST LOW 20 MIN: CPT

## 2022-11-04 RX ORDER — PREDNISONE 10 MG/1
10 TABLET ORAL
Qty: 50 | Refills: 0 | Status: DISCONTINUED | COMMUNITY
Start: 2022-04-07 | End: 2022-11-04

## 2022-11-04 RX ORDER — OMEPRAZOLE 20 MG/1
20 CAPSULE, DELAYED RELEASE ORAL
Qty: 30 | Refills: 0 | Status: DISCONTINUED | COMMUNITY
Start: 2022-03-18 | End: 2022-11-04

## 2022-11-04 RX ORDER — FLUOCINONIDE 0.5 MG/G
0.05 CREAM TOPICAL
Qty: 120 | Refills: 0 | Status: DISCONTINUED | COMMUNITY
Start: 2022-03-28 | End: 2022-11-04

## 2022-11-04 RX ORDER — MECOBALAMIN 1000 MCG
TABLET,CHEWABLE ORAL
Refills: 0 | Status: DISCONTINUED | COMMUNITY
End: 2022-11-04

## 2022-11-04 RX ORDER — LEVOCETIRIZINE DIHYDROCHLORIDE 5 MG/1
5 TABLET ORAL
Qty: 30 | Refills: 0 | Status: DISCONTINUED | COMMUNITY
Start: 2022-03-18 | End: 2022-11-04

## 2022-11-04 RX ORDER — OMEPRAZOLE 40 MG/1
40 CAPSULE, DELAYED RELEASE ORAL
Qty: 1 | Refills: 0 | Status: DISCONTINUED | COMMUNITY
Start: 2021-07-30 | End: 2022-11-04

## 2022-11-04 RX ORDER — INFLUENZA A VIRUS A/VICTORIA/4897/2022 IVR-238 (H1N1) ANTIGEN (FORMALDEHYDE INACTIVATED), INFLUENZA A VIRUS A/DARWIN/9/2021 SAN-010 (H3N2) ANTIGEN (FORMALDEHYDE INACTIVATED), INFLUENZA B VIRUS B/PHUKET/3073/2013 ANTIGEN (FORMALDEHYDE INACTIVATED), AND INFLUENZA B VIRUS B/MICHIGAN/01/2021 ANTIGEN (FORMALDEHYDE INACTIVATED) 15; 15; 15; 15 UG/.5ML; UG/.5ML; UG/.5ML; UG/.5ML
0.5 INJECTION, SUSPENSION INTRAMUSCULAR
Refills: 0 | Status: DISCONTINUED | COMMUNITY
Start: 2022-10-14 | End: 2022-11-04

## 2022-11-04 RX ORDER — AZELASTINE HYDROCHLORIDE 137 UG/1
0.1 SPRAY, METERED NASAL TWICE DAILY
Qty: 3 | Refills: 3 | Status: DISCONTINUED | COMMUNITY
Start: 2022-03-25 | End: 2022-11-04

## 2022-11-04 RX ORDER — FLUCONAZOLE 100 MG/1
100 TABLET ORAL
Qty: 15 | Refills: 0 | Status: DISCONTINUED | COMMUNITY
Start: 2022-04-08 | End: 2022-11-04

## 2022-11-04 RX ORDER — BUDESONIDE, GLYCOPYRROLATE, AND FORMOTEROL FUMARATE 160; 9; 4.8 UG/1; UG/1; UG/1
160-9-4.8 AEROSOL, METERED RESPIRATORY (INHALATION)
Qty: 3 | Refills: 1 | Status: DISCONTINUED | COMMUNITY
Start: 2022-04-07 | End: 2022-11-04

## 2022-11-04 RX ORDER — FLUTICASONE PROPIONATE AND SALMETEROL 50; 100 UG/1; UG/1
100-50 POWDER RESPIRATORY (INHALATION)
Qty: 60 | Refills: 0 | Status: DISCONTINUED | COMMUNITY
Start: 2022-03-18 | End: 2022-11-04

## 2022-11-04 RX ORDER — NITROGLYCERIN 20 MG/G
2 OINTMENT TOPICAL
Qty: 1 | Refills: 3 | Status: DISCONTINUED | COMMUNITY
Start: 2020-07-21 | End: 2022-11-04

## 2022-11-04 RX ORDER — NYSTATIN 100000 [USP'U]/ML
100000 SUSPENSION ORAL
Qty: 240 | Refills: 0 | Status: DISCONTINUED | COMMUNITY
Start: 2022-03-31 | End: 2022-11-04

## 2022-11-04 NOTE — ASSESSMENT
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  I prescribed topical nitroglycerin and instructed him to call me in a couple of weeks.  If his symptoms do not improve I would recommend Botox injection.  I explained that an internal sphincterotomy would be a final alternative if his fissure fails to heal with conservative treatment.

## 2022-11-04 NOTE — PHYSICAL EXAM
[Normal Breath Sounds] : Normal breath sounds [Normal Heart Sounds] : normal heart sounds [No Rash or Lesion] : No rash or lesion [Alert] : alert [Oriented to Person] : oriented to person [Oriented to Place] : oriented to place [Oriented to Time] : oriented to time [Calm] : calm [Abdomen Tenderness] : ~T No ~M abdominal tenderness [de-identified] : Perianal inspection reveals an acute posterior midline anal fissure.  There is no fistula abscess or enlarged external hemorrhoids.  Digital rectal examination reveals hypertonic internal sphincter tone.  Anoscopy was precluded due to his level of discomfort. [de-identified] : WNL [de-identified] : WNL [de-identified] : DELORESL [de-identified] : WNL [de-identified] : WNL ROM [de-identified] : WNL

## 2022-11-04 NOTE — HISTORY OF PRESENT ILLNESS
[FreeTextEntry1] : Patient is a 37 years old male here for a consultation for a possible rectal bleeding.\par \par Last seen 7/21/20 - In summary the patient has an acute posterior midline anal fissure. I prescribed topical nitroglycerin and recommended fiber supplementation stool softeners and warm tub baths. I instructed him to call me in 3 weeks. If his symptoms persist I would recommend Botox injection. Once his fissure has healed he should follow-up with Dr. Perez for a screening colonoscopy be 100% sure that there is no proximal source of his rectal bleeding. \par \par Colonoscopy 01/11/21 - Internal hemorrhoids, normal mucosa to the cecum and distal ileum- A1.\par \par Today pt reports feeling with BM 8/10 rectal pain, straining, bleeding, hard.  Last episode of rectal bleeding this morning onto tp, occasional toilet.  Patient taking suppositories for relief.  Patient feeling itchiness.  Patient stopped Nitroglycerine but reported helped the fissure.  Good appetite.  No c/o nausea/vomiting.  Denies fever and chills.  Patient was hospitalized 3-4 weeks ago for impacted stool at [place] per CT A/P.   Patient taking benefiber and colace.  Not on anticoagulants.  \par

## 2022-11-19 ENCOUNTER — NON-APPOINTMENT (OUTPATIENT)
Age: 37
End: 2022-11-19

## 2022-12-01 ENCOUNTER — NON-APPOINTMENT (OUTPATIENT)
Age: 37
End: 2022-12-01

## 2023-02-13 ENCOUNTER — NON-APPOINTMENT (OUTPATIENT)
Age: 38
End: 2023-02-13

## 2023-02-16 ENCOUNTER — NON-APPOINTMENT (OUTPATIENT)
Age: 38
End: 2023-02-16

## 2023-02-17 ENCOUNTER — APPOINTMENT (OUTPATIENT)
Dept: PULMONOLOGY | Facility: CLINIC | Age: 38
End: 2023-02-17

## 2023-02-21 DIAGNOSIS — Z11.59 ENCOUNTER FOR SCREENING FOR OTHER VIRAL DISEASES: ICD-10-CM

## 2023-02-21 LAB
RAPID RVP RESULT: NOT DETECTED
SARS-COV-2 RNA PNL RESP NAA+PROBE: NOT DETECTED

## 2023-02-22 ENCOUNTER — TRANSCRIPTION ENCOUNTER (OUTPATIENT)
Age: 38
End: 2023-02-22

## 2023-02-27 ENCOUNTER — APPOINTMENT (OUTPATIENT)
Dept: PULMONOLOGY | Facility: CLINIC | Age: 38
End: 2023-02-27

## 2023-02-27 ENCOUNTER — TRANSCRIPTION ENCOUNTER (OUTPATIENT)
Age: 38
End: 2023-02-27

## 2023-03-24 ENCOUNTER — APPOINTMENT (OUTPATIENT)
Dept: PULMONOLOGY | Facility: CLINIC | Age: 38
End: 2023-03-24
Payer: COMMERCIAL

## 2023-03-24 ENCOUNTER — LABORATORY RESULT (OUTPATIENT)
Age: 38
End: 2023-03-24

## 2023-03-24 ENCOUNTER — NON-APPOINTMENT (OUTPATIENT)
Age: 38
End: 2023-03-24

## 2023-03-24 VITALS
BODY MASS INDEX: 25.39 KG/M2 | TEMPERATURE: 97.4 F | HEART RATE: 60 BPM | HEIGHT: 77 IN | WEIGHT: 215 LBS | SYSTOLIC BLOOD PRESSURE: 114 MMHG | DIASTOLIC BLOOD PRESSURE: 72 MMHG | OXYGEN SATURATION: 98 % | RESPIRATION RATE: 16 BRPM

## 2023-03-24 DIAGNOSIS — U07.1 COVID-19: ICD-10-CM

## 2023-03-24 DIAGNOSIS — J12.82 COVID-19: ICD-10-CM

## 2023-03-24 DIAGNOSIS — F41.9 ANXIETY DISORDER, UNSPECIFIED: ICD-10-CM

## 2023-03-24 DIAGNOSIS — U09.9 POST COVID-19 CONDITION, UNSPECIFIED: ICD-10-CM

## 2023-03-24 PROCEDURE — 99214 OFFICE O/P EST MOD 30 MIN: CPT | Mod: 25

## 2023-03-24 PROCEDURE — 95012 NITRIC OXIDE EXP GAS DETER: CPT

## 2023-03-24 PROCEDURE — 94010 BREATHING CAPACITY TEST: CPT

## 2023-03-24 NOTE — PROCEDURE
[FreeTextEntry1] : PFT reveals normal flows, with an FEV1 of  5.82L, which is 111% of predicted, with a normal flow volume loop. \par \par FENO was 39; a normal value being less than 25\par Fractional exhaled nitric oxide (FENO) is regarded as a simple, noninvasive method for assessing eosinophilic airway inflammation. Produced by a variety of cells within the lung, nitric oxide (NO) concentrations are generally low in healthy individuals. However, high concentrations of NO appear to be involved in nonspecific host defense mechanisms and chronic inflammatory diseases such as asthma. The American Thoracic Society (ATS) therefore has recommended using FENO to aid in the diagnosis and monitoring of eosinophilic airway inflammation and asthma, and for identifying steroid responsive individuals whose chronic respiratory symptoms may be caused by airway inflammation.

## 2023-03-24 NOTE — ASSESSMENT
[FreeTextEntry1] : Mr. BILLY TUCKER is a 37 year old male who has a history of GERD, allergies, anxiety, anal fissure, s/p COVID-19 who now presents to the office for pulmonary evaluation following COVID-19 infection in March 2020. - improved s/p Covid-19 vaccine 3/2021 - reflux / poor sleep -improved; post COVID-19 (1/2022) - consistent with s/p  long COVID- breathing/ oral allergy Sx - still having mild allergy/ asthma sx/ poor recover/ GERD Sx\par \par The patient's SOB is felt to be multifactorial:\par -pulmonary \par    -COVID-19 induced bronchospasm \par - Poor breathing mechanics (anxiety) \par - Out-of Shape\par - ?Cardiac Disease - no evidence \par \par \par Problem 1: COVID-19 Induced Bronchospasm (asthma) \par -continue Trelegy 100 1 inhalation QD or  Breztri 2 BID \par -continue Xopenex (0.63) via nebulizer or xopenex HFA-TID\par -trial of Stiolto 2 puffs QD \par -Gargle and Spit after use of inhaler \par -Full PFTs  - WNL\par \par Problem 1A: COVID-19 (long haul)\par -recommend Allegra 180mg qD\par -recommend Benadryl 25mg qHs\par -recommend Zyrtec 10 qHs\par -recommended low histamine diet\par \par Problem 1B: Allergic Reaction\par  -s/p Prednisone 20 mg x 7 days, 10 mg x 7 days \par -Information sheet given to the patient to be reviewed, this medication is never to be used without consulting the prescribing physician. Proper dietary restraint is necessary specifically salt containing foods, if any reaction may occur should be reported. \par -Inhaler technique reviewed as well as oral hygiene techniques reviewed with patient. Avoidance of cold air, extremes of temperature, rescue inhaler should be used before exercise. Order of medication reviewed with patient. Recommended use of a cool mist humidifier in the bedroom. \par \par Problem 2: s/p COVID-19 Pneumonitis (s/p Pfizer vaccine 3/2021)\par -Radiographically resolved \par - PFTs wnl \par \par Problem 3:Allergies and Sinus (+)\par -continue Flonase 1 sniff/nostril BID \par -continue Zyrtec 10mg qhs PRN \par -continue Allegra 180 mg QAM \par - Environmental measures for allergies were encouraged including mattress and pillow cover, air purifier, and environmental controls.\par \par Problem 4:GERD\par  -Pepcid 40 mg BID\par -recommended Digestivaide\par -compete celiac test\par - Things to avoid including overeating, spicy foods, tight clothing, eating within three hours of bed, this list is not all inclusive.\par - For treatment of reflux, possible options discussed including diet control, H2 blockers, PPIs, as well as coating motility agents discussed as treatment options. Timing of meals and proximity of last meal to sleep were discussed. If symptoms persist, a formal gastrointestinal evaluation is needed. \par \par Problem 5: No JIE\par -s/p Home Sleep Study. (-)\par -Sleep apnea is associated with adverse clinical consequences which an affect most organ systems. Cardiovascular disease risk includes arrhythmias, atrial fibrillation, hypertension, coronary artery disease, and stroke. Metabolic disorders include diabetes type 2, non-alcoholic fatty liver disease. Mood disorder especially depression; and cognitive decline especially in the elderly. Associations with chronic reflux/Meadows’s esophagus some but not all inclusive. \par -Reasons include arousal consistent with hypopnea; respiratory events most prominent in REM sleep or supine position; therefore sleep staging and body position are important for accurate diagnosis and estimation of AHI. \par \par Problem 6: ?RLS \par -S/P to complete: iron studies, thyroid function test, free and total testosterone level. (WNL)\par \par - Restless Legs Syndrome (RLS), also known as Houser-Ekbom Disease, is a common sleep -related movement disorder. About 1 in 10 adults in the [place] have problems from restless leg syndrome. It also can be seen in about 2% of children. Women are twice as likely as men to have RLS. People with RLS will have symptoms most often during times when they are less active, especially at bedtime. RLS most often causes an overwhelming urge to move your legs and sometimes other parts of your body. This urge is associated with unpleasant sensations in different parts of th body. The symptoms can be mild to severe and can affect your ability to go to sleep and stay asleep. People with RLS often sleep less at night and feel more tired during the day. \par \par Problem 7: Anxiety \par -recommended to read "The Gift of Maybe" by Brittney Abad \par \par Problem 8: Overweight - IMPROVED- NOOM\par -recommended "MUNIQ" OTC supplement \par -recommended Mitovive \par - Weight loss, exercise, and diet control were discussed and are highly encouraged. Treatment options were given such as, aqua therapy, and contacting a nutritionist. Recommended to use the elliptical, stationary bike, less use of treadmill. Mindful eating was explained to the patient Obesity is associated with worsening asthma, shortness of breath, and potential for cardiac disease, diabetes, and other underlying medical conditions.\par \par Problem 9: Poor Breathing Mechanics\par -Recommended Wim Hof and Buteyko breathing techniques \par - Proper breathing techniques were reviewed with an emphasis of exhalation. Patient instructed to breath in for 1 second and out for four seconds. Patient was encouraged to not talk while walking.\par \par Problem 10: Health maintenance\par -recommended "Mitovive"\par -recommended SaNOtize nasal spray \par -s/p  Covid-19 vaccine Pfizer x3 \par - recommended Quercetin 2000 mg \par - recommended SPM \par - recommended liposomal Glutathione 500 mg BID \par -s/p flu shot - 2021\par -recommended strep pneumonia vaccines: Prevnar-13 vaccine, followed by Pneumo vaccine 23 one year following (after the age of 65)\par -recommended early intervention for URIs\par -recommended regular osteoporosis evaluations\par -recommended early dermatological evaluations\par -recommended after the age of 50 to the age of 70, colonoscopy every 5 years\par \par f/u in 6-8 weeks\par pt is encouraged to call or fax the office with any questions or concerns.

## 2023-03-24 NOTE — ADDENDUM
[FreeTextEntry1] : Documented by JOYCE Kelly acting as a scribe for Dr. Anderson Aly on 03/24/2023 .\par \par All medical record entries made by the Scribe were at my, Dr. Anderson Aly's, direction and personally dictated by me on 03/24/2023. I have reviewed the chart and agree that the record accurately reflects my personal performance of the history, physical exam, assessment and plan. I have also personally directed, reviewed, and agree with the discharge instructions.

## 2023-03-24 NOTE — HISTORY OF PRESENT ILLNESS
[TextBox_4] : Mr. BILLY TUCKER is a 37 year old male presenting to the office for a follow up evaluation. His chief complaint is\par \par -he notes feeling generally well \par -he notes exercising \par -he notes training for sprint marathon in June\par -he notes difficulty with the run post COVID-19\par -he notes joint pain inhibits running ability\par -he notes sleep can be improved\par -he notes shoulder issue\par -he notes AM dysphonia\par -he notes gaining weight a little\par -he notes active reflux\par \par -he denies any headaches, nausea, emesis, fever, chills, sweats, chest pain, chest pressure, coughing, wheezing, palpitations, constipation, diarrhea, vertigo, dysphagia, heartburn, itchy eyes, itchy ears, leg swelling, leg pain, or sour taste in the mouth.

## 2023-03-25 LAB — CELIACPAN: NORMAL

## 2023-03-27 ENCOUNTER — TRANSCRIPTION ENCOUNTER (OUTPATIENT)
Age: 38
End: 2023-03-27

## 2023-03-30 ENCOUNTER — TRANSCRIPTION ENCOUNTER (OUTPATIENT)
Age: 38
End: 2023-03-30

## 2023-03-30 LAB
CELIAC DISEASE INTERPRETATION: NORMAL
CELIAC GENE PAIRS PRESENT: YES
DQ ALPHA 1: NORMAL
DQ BETA 1: NORMAL
IMMUNOGLOBULIN A (IGA): 226 MG/DL

## 2023-04-05 ENCOUNTER — TRANSCRIPTION ENCOUNTER (OUTPATIENT)
Age: 38
End: 2023-04-05

## 2023-04-11 LAB
ANNOTATION COMMENT IMP: NORMAL
HLA-DQ2: POSITIVE
HLA-DQ8 QL: NEGATIVE
REF LAB TEST METHOD: NORMAL

## 2023-05-04 ENCOUNTER — TRANSCRIPTION ENCOUNTER (OUTPATIENT)
Age: 38
End: 2023-05-04

## 2023-05-04 ENCOUNTER — NON-APPOINTMENT (OUTPATIENT)
Age: 38
End: 2023-05-04

## 2023-05-04 DIAGNOSIS — R89.9 UNSPECIFIED ABNORMAL FINDING IN SPECIMENS FROM OTHER ORGANS, SYSTEMS AND TISSUES: ICD-10-CM

## 2023-07-08 ENCOUNTER — NON-APPOINTMENT (OUTPATIENT)
Age: 38
End: 2023-07-08

## 2023-08-04 ENCOUNTER — APPOINTMENT (OUTPATIENT)
Dept: PULMONOLOGY | Facility: CLINIC | Age: 38
End: 2023-08-04
Payer: COMMERCIAL

## 2023-08-04 VITALS
WEIGHT: 224 LBS | BODY MASS INDEX: 26.45 KG/M2 | TEMPERATURE: 97.8 F | HEIGHT: 77 IN | OXYGEN SATURATION: 97 % | HEART RATE: 60 BPM | RESPIRATION RATE: 16 BRPM | SYSTOLIC BLOOD PRESSURE: 110 MMHG | DIASTOLIC BLOOD PRESSURE: 70 MMHG

## 2023-08-04 DIAGNOSIS — R06.83 SNORING: ICD-10-CM

## 2023-08-04 DIAGNOSIS — K21.9 GASTRO-ESOPHAGEAL REFLUX DISEASE W/OUT ESOPHAGITIS: ICD-10-CM

## 2023-08-04 DIAGNOSIS — R06.02 SHORTNESS OF BREATH: ICD-10-CM

## 2023-08-04 DIAGNOSIS — J30.9 ALLERGIC RHINITIS, UNSPECIFIED: ICD-10-CM

## 2023-08-04 DIAGNOSIS — J45.909 UNSPECIFIED ASTHMA, UNCOMPLICATED: ICD-10-CM

## 2023-08-04 PROCEDURE — 99214 OFFICE O/P EST MOD 30 MIN: CPT | Mod: 25

## 2023-08-04 PROCEDURE — 95012 NITRIC OXIDE EXP GAS DETER: CPT

## 2023-08-04 PROCEDURE — 94010 BREATHING CAPACITY TEST: CPT

## 2023-08-04 PROCEDURE — 94729 DIFFUSING CAPACITY: CPT

## 2023-08-04 PROCEDURE — ZZZZZ: CPT

## 2023-08-04 PROCEDURE — 94727 GAS DIL/WSHOT DETER LNG VOL: CPT

## 2023-08-04 NOTE — PROCEDURE
[FreeTextEntry1] : Full PFT reveals normal flows; FEV1 was  6.20L which is 115% of predicted; normal lung volumes; normal diffusion at 47.8, which is 144% of predicted; normal flow volume loop. PFTs were performed to evaluate for SOB  FENO was 39 ; a normal value being less than 25 Fractional exhaled nitric oxide (FENO) is regarded as a simple, noninvasive method for assessing eosinophilic airway inflammation. Produced by a variety of cells within the lung, nitric oxide (NO) concentrations are generally low in healthy individuals. However, high concentrations of NO appear to be involved in nonspecific host defense mechanisms and chronic inflammatory diseases such as asthma. The American Thoracic Society (ATS) therefore has recommended using FENO to aid in the diagnosis and monitoring of eosinophilic airway inflammation and asthma, and for identifying steroid responsive individuals whose chronic respiratory symptoms may be caused by airway inflammation.
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
## 2023-08-04 NOTE — ADDENDUM
[FreeTextEntry1] : Documented by Walter Coates acting as a scribe for Dr. Anderson Aly on 08/04/2023. All medical record entries made by the Scribe were at my, Dr. Anderson Aly's, direction and personally dictated by me on 08/04/2023. I have reviewed the chart and agree that the record accurately reflects my personal performance of the history, physical exam, assessment and plan. I have also personally directed, reviewed, and agree with the discharge instructions.

## 2023-08-04 NOTE — ASSESSMENT
[FreeTextEntry1] : Mr. BILLY TUCKER is a 38 year old male who has a history of GERD, allergies, anxiety, anal fissure, s/p COVID-19 who now presents to the office for pulmonary evaluation following COVID-19 infection in March 2020. - improved s/p Covid-19 vaccine 3/2021 - reflux / poor sleep -improved; post COVID-19 (1/2022) - consistent with s/p  long COVID- breathing/ oral allergy Sx - still having mild allergy/ asthma sx/ poor recover/ GERD Sx (still)  The patient's SOB is felt to be multifactorial: -pulmonary     -COVID-19 induced bronchospasm  - Poor breathing mechanics (anxiety)  - Out-of Shape - ?Cardiac Disease - no evidence    Problem 1: COVID-19 Induced Bronchospasm (asthma)  -continue Trelegy 100 1 inhalation QD or  Breztri 2 BID  -continue Xopenex (0.63) via nebulizer or xopenex HFA-TID -Gargle and Spit after use of inhaler  -Full PFTs  - WNL  Problem 1A: COVID-19 (long haul) -recommend Allegra 180mg qD -recommend Benadryl 25mg qHs -recommend Zyrtec 10 qHs -recommended low histamine diet  Problem 1B: Allergic Reaction  -s/p Prednisone 20 mg x 7 days, 10 mg x 7 days  -Information sheet given to the patient to be reviewed, this medication is never to be used without consulting the prescribing physician. Proper dietary restraint is necessary specifically salt containing foods, if any reaction may occur should be reported.  -Inhaler technique reviewed as well as oral hygiene techniques reviewed with patient. Avoidance of cold air, extremes of temperature, rescue inhaler should be used before exercise. Order of medication reviewed with patient. Recommended use of a cool mist humidifier in the bedroom.   Problem 2: s/p COVID-19 Pneumonitis (s/p Pfizer vaccine 3/2021) -Radiographically resolved  - PFTs wnl   Problem 3:Allergies and Sinus (+) -continue Flonase 1 sniff/nostril BID  -continue Zyrtec 10mg qhs PRN  -continue Allegra 180 mg QAM  - Environmental measures for allergies were encouraged including mattress and pillow cover, air purifier, and environmental controls.  Problem 4:GERD -recommended Reflux Gourmet   -Pepcid 40 mg BID -recommended Digestivaide -compete celiac test - Things to avoid including overeating, spicy foods, tight clothing, eating within three hours of bed, this list is not all inclusive. - For treatment of reflux, possible options discussed including diet control, H2 blockers, PPIs, as well as coating motility agents discussed as treatment options. Timing of meals and proximity of last meal to sleep were discussed. If symptoms persist, a formal gastrointestinal evaluation is needed.   Problem 5: No JIE -s/p Home Sleep Study. (-) -Sleep apnea is associated with adverse clinical consequences which an affect most organ systems. Cardiovascular disease risk includes arrhythmias, atrial fibrillation, hypertension, coronary artery disease, and stroke. Metabolic disorders include diabetes type 2, non-alcoholic fatty liver disease. Mood disorder especially depression; and cognitive decline especially in the elderly. Associations with chronic reflux/Meadows's esophagus some but not all inclusive.  -Reasons include arousal consistent with hypopnea; respiratory events most prominent in REM sleep or supine position; therefore sleep staging and body position are important for accurate diagnosis and estimation of AHI.   Problem 6: ?RLS  -S/P to complete: iron studies, thyroid function test, free and total testosterone level. (WNL)  - Restless Legs Syndrome (RLS), also known as Houser-Ekbom Disease, is a common sleep -related movement disorder. About 1 in 10 adults in the [place] have problems from restless leg syndrome. It also can be seen in about 2% of children. Women are twice as likely as men to have RLS. People with RLS will have symptoms most often during times when they are less active, especially at bedtime. RLS most often causes an overwhelming urge to move your legs and sometimes other parts of your body. This urge is associated with unpleasant sensations in different parts of th body. The symptoms can be mild to severe and can affect your ability to go to sleep and stay asleep. People with RLS often sleep less at night and feel more tired during the day.   Problem 7: Anxiety  -recommended to read "The Gift of Maybe" by Brittney Abad   Problem 8: Overweight - IMPROVED- NOOM -recommended "MUNIQ" OTC supplement  -recommended Mitovive  - Weight loss, exercise, and diet control were discussed and are highly encouraged. Treatment options were given such as, aqua therapy, and contacting a nutritionist. Recommended to use the elliptical, stationary bike, less use of treadmill. Mindful eating was explained to the patient Obesity is associated with worsening asthma, shortness of breath, and potential for cardiac disease, diabetes, and other underlying medical conditions.  Problem 9: Poor Breathing Mechanics -Recommended Jacob Lawrence and Sylvie breathing techniques  - Proper breathing techniques were reviewed with an emphasis of exhalation. Patient instructed to breath in for 1 second and out for four seconds. Patient was encouraged to not talk while walking.  Problem 10: Health maintenance -recommended "Mitovive" -recommended SaNOtize nasal spray  -s/p  Covid-19 vaccine Pfizer x3  - recommended Quercetin 2000 mg  - recommended SPM  - recommended liposomal Glutathione 500 mg BID  -s/p flu shot - 2022 -recommended strep pneumonia vaccines: Prevnar-13 vaccine, followed by Pneumo vaccine 23 one year following (after the age of 65) -recommended early intervention for URIs -recommended regular osteoporosis evaluations -recommended early dermatological evaluations -recommended after the age of 50 to the age of 70, colonoscopy every 5 years  f/u in 6 months  pt is encouraged to call or fax the office with any questions or concerns.

## 2023-08-21 ENCOUNTER — NON-APPOINTMENT (OUTPATIENT)
Age: 38
End: 2023-08-21

## 2023-09-05 ENCOUNTER — APPOINTMENT (OUTPATIENT)
Dept: PEDIATRIC ALLERGY IMMUNOLOGY | Facility: CLINIC | Age: 38
End: 2023-09-05
Payer: COMMERCIAL

## 2023-09-05 VITALS
HEIGHT: 77 IN | HEART RATE: 60 BPM | BODY MASS INDEX: 26.7 KG/M2 | WEIGHT: 226.13 LBS | SYSTOLIC BLOOD PRESSURE: 122 MMHG | OXYGEN SATURATION: 98 % | DIASTOLIC BLOOD PRESSURE: 73 MMHG

## 2023-09-05 DIAGNOSIS — Z83.79 FAMILY HISTORY OF OTHER DISEASES OF THE DIGESTIVE SYSTEM: ICD-10-CM

## 2023-09-05 DIAGNOSIS — J30.89 OTHER ALLERGIC RHINITIS: ICD-10-CM

## 2023-09-05 DIAGNOSIS — T50.905A ADVERSE EFFECT OF UNSPECIFIED DRUGS, MEDICAMENTS AND BIOLOGICAL SUBSTANCES, INITIAL ENCOUNTER: ICD-10-CM

## 2023-09-05 DIAGNOSIS — J30.1 ALLERGIC RHINITIS DUE TO POLLEN: ICD-10-CM

## 2023-09-05 PROCEDURE — 95004 PERQ TESTS W/ALRGNC XTRCS: CPT

## 2023-09-05 PROCEDURE — 95018 ALL TSTG PERQ&IQ DRUGS/BIOL: CPT

## 2023-09-05 PROCEDURE — 99203 OFFICE O/P NEW LOW 30 MIN: CPT | Mod: 25

## 2023-09-05 RX ORDER — IPRATROPIUM BROMIDE 17 UG/1
17 AEROSOL, METERED RESPIRATORY (INHALATION)
Qty: 1 | Refills: 1 | Status: COMPLETED | COMMUNITY
Start: 2020-09-18 | End: 2023-09-05

## 2023-09-05 RX ORDER — NITROGLYCERIN 20 MG/G
2 OINTMENT TOPICAL
Qty: 1 | Refills: 3 | Status: COMPLETED | COMMUNITY
Start: 2022-11-04 | End: 2023-09-05

## 2023-09-06 ENCOUNTER — APPOINTMENT (OUTPATIENT)
Dept: PEDIATRIC ALLERGY IMMUNOLOGY | Facility: CLINIC | Age: 38
End: 2023-09-06
Payer: COMMERCIAL

## 2023-09-06 VITALS
OXYGEN SATURATION: 96 % | HEIGHT: 77 IN | DIASTOLIC BLOOD PRESSURE: 77 MMHG | SYSTOLIC BLOOD PRESSURE: 122 MMHG | WEIGHT: 226.13 LBS | HEART RATE: 60 BPM | BODY MASS INDEX: 26.7 KG/M2

## 2023-09-06 DIAGNOSIS — T50.905D ADVERSE EFFECT OF UNSPECIFIED DRUGS, MEDICAMENTS AND BIOLOGICAL SUBSTANCES, SUBSEQUENT ENCOUNTER: ICD-10-CM

## 2023-09-06 DIAGNOSIS — L27.0 GENERALIZED SKIN ERUPTION DUE TO DRUGS AND MEDICAMENTS TAKEN INTERNALLY: ICD-10-CM

## 2023-09-06 PROCEDURE — 99212 OFFICE O/P EST SF 10 MIN: CPT

## 2023-09-07 PROBLEM — T50.905D ADVERSE DRUG REACTION, SUBSEQUENT ENCOUNTER: Status: ACTIVE | Noted: 2023-09-07

## 2023-09-07 PROBLEM — T50.905A ADVERSE DRUG REACTION, INITIAL ENCOUNTER: Status: ACTIVE | Noted: 2023-09-07

## 2023-09-07 PROBLEM — J30.89 ALLERGIC RHINITIS DUE TO HOUSE DUST MITE: Status: ACTIVE | Noted: 2023-09-07

## 2023-09-07 PROBLEM — L27.0 DERMATITIS DUE TO DRUGS OR MEDICINES: Status: ACTIVE | Noted: 2023-09-07

## 2023-09-07 PROBLEM — J30.1 SEASONAL ALLERGIC RHINITIS DUE TO POLLEN: Status: ACTIVE | Noted: 2023-09-07

## 2023-09-07 RX ORDER — CETIRIZINE HYDROCHLORIDE 10 MG/1
10 TABLET, FILM COATED ORAL DAILY
Qty: 30 | Refills: 1 | Status: ACTIVE | COMMUNITY
Start: 2020-10-07

## 2023-09-07 RX ORDER — FLUTICASONE PROPIONATE 50 UG/1
50 SPRAY, METERED NASAL TWICE DAILY
Qty: 3 | Refills: 3 | Status: ACTIVE | COMMUNITY
Start: 2022-03-25

## 2023-09-07 NOTE — CONSULT LETTER
[Dear  ___] : Dear  [unfilled], [Consult Letter:] : I had the pleasure of evaluating your patient, [unfilled]. [Please see my note below.] : Please see my note below. [Consult Closing:] : Thank you very much for allowing me to participate in the care of this patient.  If you have any questions, please do not hesitate to contact me. [Sincerely,] : Sincerely, [DrRadhika  ___] : Dr. TRUONG [FreeTextEntry3] : MD GARCIA Feldman, LEATHA Adult and Pediatric Allergy, Asthma and Clinical Immunology Associate Professor of Medicine and Pediatrics at   [facility], Adult Allergy and Immunology   [facility]   Division of Allergy, Asthma and Immunology   [address] [place] [address], [place] [address]   Phone [phone]  Fax [phone]

## 2023-09-07 NOTE — HISTORY OF PRESENT ILLNESS
[Eczematous rashes] : eczematous rashes [Venom Reactions] : venom reactions [de-identified] : BILLY TUCKER  is a 38 year old  male with GERD/LPRD, history of long COVID-19, Allergic Rhinitis  [facility] his medication reaction(s). He has a history of lactose and gluten intolerance- gets abdominal pain, bloating, headache. , Allergic Rhinitis   No new complains and no interval problems. He  feels well.  He returns for delayed propofol skin testing reading.    History of DRUG REACTIONS: - March/2022- reaction attributed to Propofol or Nystatin. He was started on nystatin for dysphagia. He took it for 4 days and on day 5 had upper endoscopy, used only propofol, 24 hours after the endoscopy he developed rash, that started on the abdomen and then spread to the  back. Rash wasnt very itchy, didnt blister, Rx with steroids, resolved completely within 2-3 weeks, no hyperpigmentation.  He stopped Nystatin when rash started. He had multiple procedures and used Propofol in the past. Pictures reviewed: erythematous rash on the abdomen and back.   Now on famotidine, stopped a few days ago.   He takes nasal fluticasone and cetirizine PRN for environmental allergies.  He is very allergic to cats  He was tested to foods and was told that he is allergic to shellfish and mushrooms. He eats and tolerates shellfish. He stopped eating mushrooms because he doesn't care for them but never had a reaction.   He had long COVID-19, had asthma issues was on a lot of COPD medications, now mostly resolved.  He is followed by Dr Aly.

## 2023-09-07 NOTE — CONSULT LETTER
[Dear  ___] : Dear  [unfilled], [Consult Letter:] : I had the pleasure of evaluating your patient, [unfilled]. [Please see my note below.] : Please see my note below. [Consult Closing:] : Thank you very much for allowing me to participate in the care of this patient.  If you have any questions, please do not hesitate to contact me. [Sincerely,] : Sincerely, [FreeTextEntry3] : MD GARCIA Feldman, LEATHA Adult and Pediatric Allergy, Asthma and Clinical Immunology Associate Professor of Medicine and Pediatrics at   [facility], Adult Allergy and Immunology   [facility]   Division of Allergy, Asthma and Immunology   [address] [place] [address], [place] [address]   Phone [phone]  Fax [phone]    [DrRadhika  ___] : Dr. TRUONG

## 2023-09-07 NOTE — HISTORY OF PRESENT ILLNESS
[Eczematous rashes] : eczematous rashes [Venom Reactions] : venom reactions [de-identified] : BILLY TUCKER  is a 38 year old  male with GERD/LPRD, histroy of long COVID-19, Allergic Rhinitis  [facility] address his medication reaction(s). He has a history of lactose and gluten intolerance- gets abdominal pain, bloating, headache. , Allergic Rhinitis  History of DRUG REACTIONS: - March/2022- reaction attributed to Propofol or Nystatin. He was started on nystatin for dysphagia. He took it for 4 days and on day 5 had upper endoscopy, used only propofol, 24 hours after the endoscopy he developed rash, that started on the abdomen and then spread to the  back. Rash wasnt very itchy, didnt blister, Rx with steroids, resolved completely within 2-3 weeks, no hyperpigmentation.  He stopped Nystatin when rash started. He had multiple procedures and used Propofol in the past. Pictures reviewed: erythematous rash on the abdomen and back.   Now on famotidine, stopped a few days ago.   He takes nasal fluticasone and cetirizine PRN for environmental allergies.  He is very allergic to cats  He was tested to foods and was told that he is allergic to shellfish and mushrooms. He eats and tolerates shellfish. He stopped eating mushrooms because he doesn't care for them but never had a reaction.   He had long COVID-19, had asthma issues was on a lot of COPD medications, now mostly resolved.  He is followed by Dr Aly.

## 2023-09-07 NOTE — REVIEW OF SYSTEMS
[Rhinorrhea] : rhinorrhea [Nasal Congestion] : nasal congestion [Post Nasal Drip] : post nasal drip [Nl] : Respiratory [Fever] : no fever [Urticaria] : no urticaria [Swelling] : no swelling [FreeTextEntry7] : GERD

## 2023-09-07 NOTE — PHYSICAL EXAM
[Alert] : alert [Well Nourished] : well nourished [Healthy Appearance] : healthy appearance [No Acute Distress] : no acute distress [Normal Voice/Communication] : normal voice communication [Normal Rate and Effort] : normal respiratory rhythm and effort [No Rash] : no rash [Normal Mood] : mood was normal [Normal Affect] : affect was normal [Alert, Awake, Oriented as Age-Appropriate] : alert, awake, oriented as age appropriate [Urticaria] : no urticaria

## 2023-09-07 NOTE — IMPRESSION
Rx sent- called and advised patient.    [_____] : grasses ([unfilled]) [Allergy Testing Cockroach] : cockroach [Allergy Testing Dog] : dog [Allergy Testing Cat] : cat [Allergy Testing Weeds] : weeds [________] : [unfilled] [] : Propofol

## 2023-09-07 NOTE — PHYSICAL EXAM
[Alert] : alert [Well Nourished] : well nourished [Healthy Appearance] : healthy appearance [No Acute Distress] : no acute distress [Well Developed] : well developed [Normal Pupil & Iris Size/Symmetry] : normal pupil and iris size and symmetry [No Discharge] : no discharge [No Photophobia] : no photophobia [Sclera Not Icteric] : sclera not icteric [Normal TMs] : both tympanic membranes were normal [Normal Lips/Tongue] : the lips and tongue were normal [Normal Outer Ear/Nose] : the ears and nose were normal in appearance [No Thrush] : no thrush [Pale mucosa] : pale mucosa [Boggy Nasal Turbinates] : boggy and/or pale nasal turbinates [Posterior Pharyngeal Cobblestoning] : posterior pharyngeal cobblestoning [Supple] : the neck was supple [Normal Rate and Effort] : normal respiratory rhythm and effort [No Crackles] : no crackles [Bilateral Audible Breath Sounds] : bilateral audible breath sounds [Normal Rate] : heart rate was normal  [Normal S1, S2] : normal S1 and S2 [Regular Rhythm] : with a regular rhythm [Soft] : abdomen soft [Not Tender] : non-tender [Not Distended] : not distended [No HSM] : no hepato-splenomegaly [Normal Cervical Lymph Nodes] : cervical [No Rash] : no rash [No clubbing] : no clubbing [No Edema] : no edema [No Cyanosis] : no cyanosis [Normal Mood] : mood was normal [Normal Affect] : affect was normal [Alert, Awake, Oriented as Age-Appropriate] : alert, awake, oriented as age appropriate [Pharyngeal erythema] : no pharyngeal erythema [Clear Rhinorrhea] : no clear rhinorrhea was seen [Exudate] : no exudate [Wheezing] : no wheezing was heard [Urticaria] : no urticaria

## 2023-10-07 ENCOUNTER — NON-APPOINTMENT (OUTPATIENT)
Age: 38
End: 2023-10-07

## 2023-10-09 ENCOUNTER — OUTPATIENT (OUTPATIENT)
Dept: OUTPATIENT SERVICES | Facility: HOSPITAL | Age: 38
LOS: 1 days | End: 2023-10-09
Payer: COMMERCIAL

## 2023-10-09 VITALS
TEMPERATURE: 98 F | RESPIRATION RATE: 18 BRPM | HEART RATE: 54 BPM | SYSTOLIC BLOOD PRESSURE: 130 MMHG | HEIGHT: 77 IN | DIASTOLIC BLOOD PRESSURE: 84 MMHG | OXYGEN SATURATION: 97 % | WEIGHT: 227.08 LBS

## 2023-10-09 DIAGNOSIS — Z98.890 OTHER SPECIFIED POSTPROCEDURAL STATES: Chronic | ICD-10-CM

## 2023-10-09 DIAGNOSIS — M47.812 SPONDYLOSIS WITHOUT MYELOPATHY OR RADICULOPATHY, CERVICAL REGION: ICD-10-CM

## 2023-10-09 DIAGNOSIS — Z01.818 ENCOUNTER FOR OTHER PREPROCEDURAL EXAMINATION: ICD-10-CM

## 2023-10-09 DIAGNOSIS — Z29.9 ENCOUNTER FOR PROPHYLACTIC MEASURES, UNSPECIFIED: ICD-10-CM

## 2023-10-09 LAB
A1C WITH ESTIMATED AVERAGE GLUCOSE RESULT: 5.6 % — SIGNIFICANT CHANGE UP (ref 4–5.6)
ANION GAP SERPL CALC-SCNC: 13 MMOL/L — SIGNIFICANT CHANGE UP (ref 5–17)
BLD GP AB SCN SERPL QL: NEGATIVE — SIGNIFICANT CHANGE UP
BUN SERPL-MCNC: 10 MG/DL — SIGNIFICANT CHANGE UP (ref 7–23)
CALCIUM SERPL-MCNC: 9.6 MG/DL — SIGNIFICANT CHANGE UP (ref 8.4–10.5)
CHLORIDE SERPL-SCNC: 104 MMOL/L — SIGNIFICANT CHANGE UP (ref 96–108)
CO2 SERPL-SCNC: 24 MMOL/L — SIGNIFICANT CHANGE UP (ref 22–31)
CREAT SERPL-MCNC: 0.79 MG/DL — SIGNIFICANT CHANGE UP (ref 0.5–1.3)
EGFR: 117 ML/MIN/1.73M2 — SIGNIFICANT CHANGE UP
ESTIMATED AVERAGE GLUCOSE: 114 MG/DL — SIGNIFICANT CHANGE UP (ref 68–114)
GLUCOSE SERPL-MCNC: 88 MG/DL — SIGNIFICANT CHANGE UP (ref 70–99)
HCT VFR BLD CALC: 43.6 % — SIGNIFICANT CHANGE UP (ref 39–50)
HGB BLD-MCNC: 14.6 G/DL — SIGNIFICANT CHANGE UP (ref 13–17)
MCHC RBC-ENTMCNC: 30.4 PG — SIGNIFICANT CHANGE UP (ref 27–34)
MCHC RBC-ENTMCNC: 33.5 GM/DL — SIGNIFICANT CHANGE UP (ref 32–36)
MCV RBC AUTO: 90.6 FL — SIGNIFICANT CHANGE UP (ref 80–100)
MRSA PCR RESULT.: SIGNIFICANT CHANGE UP
NRBC # BLD: 0 /100 WBCS — SIGNIFICANT CHANGE UP (ref 0–0)
PLATELET # BLD AUTO: 200 K/UL — SIGNIFICANT CHANGE UP (ref 150–400)
POTASSIUM SERPL-MCNC: 3.9 MMOL/L — SIGNIFICANT CHANGE UP (ref 3.5–5.3)
POTASSIUM SERPL-SCNC: 3.9 MMOL/L — SIGNIFICANT CHANGE UP (ref 3.5–5.3)
RBC # BLD: 4.81 M/UL — SIGNIFICANT CHANGE UP (ref 4.2–5.8)
RBC # FLD: 12.3 % — SIGNIFICANT CHANGE UP (ref 10.3–14.5)
RH IG SCN BLD-IMP: POSITIVE — SIGNIFICANT CHANGE UP
S AUREUS DNA NOSE QL NAA+PROBE: SIGNIFICANT CHANGE UP
SODIUM SERPL-SCNC: 141 MMOL/L — SIGNIFICANT CHANGE UP (ref 135–145)
WBC # BLD: 4.93 K/UL — SIGNIFICANT CHANGE UP (ref 3.8–10.5)
WBC # FLD AUTO: 4.93 K/UL — SIGNIFICANT CHANGE UP (ref 3.8–10.5)

## 2023-10-09 PROCEDURE — 83036 HEMOGLOBIN GLYCOSYLATED A1C: CPT

## 2023-10-09 PROCEDURE — G0463: CPT

## 2023-10-09 PROCEDURE — 86850 RBC ANTIBODY SCREEN: CPT

## 2023-10-09 PROCEDURE — 86900 BLOOD TYPING SEROLOGIC ABO: CPT

## 2023-10-09 PROCEDURE — 85027 COMPLETE CBC AUTOMATED: CPT

## 2023-10-09 PROCEDURE — 87641 MR-STAPH DNA AMP PROBE: CPT

## 2023-10-09 PROCEDURE — 80048 BASIC METABOLIC PNL TOTAL CA: CPT

## 2023-10-09 PROCEDURE — 86901 BLOOD TYPING SEROLOGIC RH(D): CPT

## 2023-10-09 PROCEDURE — 87640 STAPH A DNA AMP PROBE: CPT

## 2023-10-09 NOTE — H&P PST ADULT - DOES PATIENT HAVE ADVANCE DIRECTIVE
Pt arrived on unit on 1/21/21 at 2315Received pt from ER per cart.  Alert and oriented x4.  Ambulated to bed with SBA.    Charted on wrong pt   No

## 2023-10-09 NOTE — H&P PST ADULT - MUSCULOSKELETAL
Decreased lateral neck ROM/no joint swelling/no joint erythema/no joint warmth/decreased ROM due to pain/normal gait/strength 5/5 bilateral lower extremities details… Decreased lateral/side to side neck ROM/no joint swelling/no joint erythema/no joint warmth/decreased ROM due to pain/normal gait/strength 5/5 bilateral lower extremities/back exam

## 2023-10-09 NOTE — H&P PST ADULT - PROBLEM SELECTOR PLAN 1
Plan for C4-C5 anterior cervical discectomy and fusion on 11/6/23 with Dr. Castro.  PST labs sent per ERP protocol  Pre procedure surgical scrub instructions discussed  Pre-op education provided - all questions answered

## 2023-10-09 NOTE — H&P PST ADULT - HISTORY OF PRESENT ILLNESS
38 year old male with GERD, history of long COVID-19 (in 2020), allergic rhinitis-follows with pulmonologist, Pt had rash in March 2022 possibly attributed to Propofol or Nystatin after endoscopy.  Pt saw allergist in Sept this year and was cleared for use of Propofol for surgery.  Pt with C spine spondylosis, chronic neck and left arm pain and weakness, progressively worsening.  Presents to PST pre op for C4-C5 anterior cervical discectomy and fusion on 11/6/23 with Dr. Castro. 38 year old male with GERD, history of long COVID-19 (in 2020), allergic rhinitis-follows with pulmonologist, Pt had rash in March 2022 possibly attributed to Propofol or Nystatin after endoscopy.  Pt saw allergist in Sept this year and was cleared for use of Propofol for surgery.  Pt with C spine spondylosis, chronic neck and left arm pain and weakness, progressively worsening, not improved with PT.  Presents to PST pre op for C4-C5 anterior cervical discectomy and fusion on 11/6/23 with Dr. Castro.

## 2023-10-09 NOTE — H&P PST ADULT - NSICDXPASTMEDICALHX_GEN_ALL_CORE_FT
PAST MEDICAL HISTORY:  2019 novel coronavirus disease (COVID-19)     Anxiety     Cervical spondylosis     No pertinent past medical history

## 2023-10-09 NOTE — H&P PST ADULT - NSHP PST SURGERY DATE_DT_GEN_A_CORE
Circumcision baby    Date/Time: 2021 6:05 PM  Performed by: MATTHEW Yang  Authorized by: MATTHEW Yang     Written consent obtained?: Yes    Risks and benefits: Risks, benefits and alternatives were discussed    Consent given by:  Parent  Site marked: No (penis)    Required items: Required blood products, implants, devices and special equipment available    Patient identity confirmed:  Arm band, provided demographic data and hospital-assigned identification number  Time out: Immediately prior to the procedure a time out was called    Anatomy: Normal    Vitamin K: Confirmed    Restraint:  Standard molded circumcision board  Pain management / analgesia:  0 8 mL 1% lidocaine intradermal 1 time  Prep Used:  Betadine  Clamps:      Gomco     1 3 cm  Instrument was checked pre-procedure and approximated appropriately    Complications: No    Estimated Blood Loss (mL):  0 1   Tolerated with minimal crying  Hemostasis achieved by end of procedure  06-Nov-2023

## 2023-10-09 NOTE — H&P PST ADULT - ASSESSMENT
DASI score: 8.23  DASI activity: Active, limited due to neck pain, has not been doing strenuous activity to protect neck before surgery, was training for triathlon 1 year ago   Loose teeth or dentures: Denies  Airway: MP1    CAPRINI VTE 2.0 SCORE [CLOT updated 2019]    AGE RELATED RISK FACTORS                                                       MOBILITY RELATED FACTORS  [ ] Age 41-60 years                                            (1 Point)                    [ ] Bed rest                                                        (1 Point)  [ ] Age: 61-74 years                                           (2 Points)                  [ ] Plaster cast                                                   (2 Points)  [ ] Age= 75 years                                              (3 Points)                    [ ] Bed bound for more than 72 hours                 (2 Points)    DISEASE RELATED RISK FACTORS                                               GENDER SPECIFIC FACTORS  [ ] Edema in the lower extremities                       (1 Point)              [ ] Pregnancy                                                     (1 Point)  [ ] Varicose veins                                               (1 Point)                     [ ] Post-partum < 6 weeks                                   (1 Point)             [x ] BMI > 25 Kg/m2                                            (1 Point)                     [ ] Hormonal therapy  or oral contraception          (1 Point)                 [ ] Sepsis (in the previous month)                        (1 Point)               [ ] History of pregnancy complications                 (1 point)  [ ] Pneumonia or serious lung disease                                               [ ] Unexplained or recurrent                     (1 Point)           (in the previous month)                               (1 Point)  [ ] Abnormal pulmonary function test                     (1 Point)                 SURGERY RELATED RISK FACTORS  [ ] Acute myocardial infarction                              (1 Point)               [ ]  Section                                             (1 Point)  [ ] Congestive heart failure (in the previous month)  (1 Point)      [ ] Minor surgery                                                  (1 Point)   [ ] Inflammatory bowel disease                             (1 Point)               [ ] Arthroscopic surgery                                        (2 Points)  [ ] Central venous access                                      (2 Points)                [x ] General surgery lasting more than 45 minutes (2 points)  [ ] Malignancy- Present or previous                   (2 Points)                [ ] Elective arthroplasty                                         (5 points)    [ ] Stroke (in the previous month)                          (5 Points)                                                                                                                                                           HEMATOLOGY RELATED FACTORS                                                 TRAUMA RELATED RISK FACTORS  [ ] Prior episodes of VTE                                     (3 Points)                [ ] Fracture of the hip, pelvis, or leg                       (5 Points)  [ ] Positive family history for VTE                         (3 Points)             [ ] Acute spinal cord injury (in the previous month)  (5 Points)  [ ] Prothrombin 38303 A                                     (3 Points)               [ ] Paralysis  (less than 1 month)                             (5 Points)  [ ] Factor V Leiden                                             (3 Points)                  [ ] Multiple Trauma within 1 month                        (5 Points)  [ ] Lupus anticoagulants                                     (3 Points)                                                           [ ] Anticardiolipin antibodies                               (3 Points)                                                       [ ] High homocysteine in the blood                      (3 Points)                                             [ ] Other congenital or acquired thrombophilia      (3 Points)                                                [ ] Heparin induced thrombocytopenia                  (3 Points)                                     Total Score [      3    ]

## 2023-11-05 ENCOUNTER — TRANSCRIPTION ENCOUNTER (OUTPATIENT)
Age: 38
End: 2023-11-05

## 2023-11-06 ENCOUNTER — INPATIENT (INPATIENT)
Facility: HOSPITAL | Age: 38
LOS: 0 days | Discharge: HOME | DRG: 473 | End: 2023-11-07
Attending: NEUROLOGICAL SURGERY | Admitting: NEUROLOGICAL SURGERY
Payer: COMMERCIAL

## 2023-11-06 ENCOUNTER — TRANSCRIPTION ENCOUNTER (OUTPATIENT)
Age: 38
End: 2023-11-06

## 2023-11-06 VITALS
SYSTOLIC BLOOD PRESSURE: 145 MMHG | RESPIRATION RATE: 18 BRPM | HEART RATE: 66 BPM | TEMPERATURE: 98 F | DIASTOLIC BLOOD PRESSURE: 83 MMHG | OXYGEN SATURATION: 98 % | HEIGHT: 77.01 IN | WEIGHT: 227.08 LBS

## 2023-11-06 DIAGNOSIS — Z98.890 OTHER SPECIFIED POSTPROCEDURAL STATES: Chronic | ICD-10-CM

## 2023-11-06 DIAGNOSIS — M47.812 SPONDYLOSIS WITHOUT MYELOPATHY OR RADICULOPATHY, CERVICAL REGION: ICD-10-CM

## 2023-11-06 LAB
GLUCOSE BLDC GLUCOMTR-MCNC: 104 MG/DL — HIGH (ref 70–99)
GLUCOSE BLDC GLUCOMTR-MCNC: 104 MG/DL — HIGH (ref 70–99)
GLUCOSE BLDC GLUCOMTR-MCNC: 85 MG/DL — SIGNIFICANT CHANGE UP (ref 70–99)
GLUCOSE BLDC GLUCOMTR-MCNC: 85 MG/DL — SIGNIFICANT CHANGE UP (ref 70–99)

## 2023-11-06 DEVICE — IMPLANTABLE DEVICE: Type: IMPLANTABLE DEVICE | Status: FUNCTIONAL

## 2023-11-06 DEVICE — SURGIFOAM PAD 8CM X 12.5CM X 10MM (100): Type: IMPLANTABLE DEVICE | Status: FUNCTIONAL

## 2023-11-06 DEVICE — PIN PRE-FIXATION: Type: IMPLANTABLE DEVICE | Status: FUNCTIONAL

## 2023-11-06 RX ORDER — SODIUM CHLORIDE 9 MG/ML
3 INJECTION INTRAMUSCULAR; INTRAVENOUS; SUBCUTANEOUS EVERY 8 HOURS
Refills: 0 | Status: DISCONTINUED | OUTPATIENT
Start: 2023-11-06 | End: 2023-11-06

## 2023-11-06 RX ORDER — BENZOCAINE AND MENTHOL 5; 1 G/100ML; G/100ML
1 LIQUID ORAL THREE TIMES A DAY
Refills: 0 | Status: DISCONTINUED | OUTPATIENT
Start: 2023-11-06 | End: 2023-11-07

## 2023-11-06 RX ORDER — METHOCARBAMOL 500 MG/1
500 TABLET, FILM COATED ORAL EVERY 8 HOURS
Refills: 0 | Status: DISCONTINUED | OUTPATIENT
Start: 2023-11-06 | End: 2023-11-07

## 2023-11-06 RX ORDER — PANTOPRAZOLE SODIUM 20 MG/1
40 TABLET, DELAYED RELEASE ORAL
Refills: 0 | Status: DISCONTINUED | OUTPATIENT
Start: 2023-11-06 | End: 2023-11-07

## 2023-11-06 RX ORDER — HYDROMORPHONE HYDROCHLORIDE 2 MG/ML
0.5 INJECTION INTRAMUSCULAR; INTRAVENOUS; SUBCUTANEOUS EVERY 4 HOURS
Refills: 0 | Status: DISCONTINUED | OUTPATIENT
Start: 2023-11-06 | End: 2023-11-06

## 2023-11-06 RX ORDER — SODIUM CHLORIDE 9 MG/ML
1000 INJECTION, SOLUTION INTRAVENOUS
Refills: 0 | Status: DISCONTINUED | OUTPATIENT
Start: 2023-11-06 | End: 2023-11-07

## 2023-11-06 RX ORDER — ONDANSETRON 8 MG/1
4 TABLET, FILM COATED ORAL EVERY 6 HOURS
Refills: 0 | Status: DISCONTINUED | OUTPATIENT
Start: 2023-11-06 | End: 2023-11-07

## 2023-11-06 RX ORDER — SENNA PLUS 8.6 MG/1
2 TABLET ORAL AT BEDTIME
Refills: 0 | Status: DISCONTINUED | OUTPATIENT
Start: 2023-11-06 | End: 2023-11-07

## 2023-11-06 RX ORDER — LIDOCAINE HCL 20 MG/ML
0.2 VIAL (ML) INJECTION ONCE
Refills: 0 | Status: DISCONTINUED | OUTPATIENT
Start: 2023-11-06 | End: 2023-11-06

## 2023-11-06 RX ORDER — CETIRIZINE HYDROCHLORIDE 10 MG/1
1 TABLET ORAL
Refills: 0 | DISCHARGE

## 2023-11-06 RX ORDER — LORATADINE 10 MG/1
10 TABLET ORAL DAILY
Refills: 0 | Status: DISCONTINUED | OUTPATIENT
Start: 2023-11-06 | End: 2023-11-07

## 2023-11-06 RX ORDER — OXYCODONE HYDROCHLORIDE 5 MG/1
5 TABLET ORAL EVERY 6 HOURS
Refills: 0 | Status: DISCONTINUED | OUTPATIENT
Start: 2023-11-06 | End: 2023-11-07

## 2023-11-06 RX ORDER — CHLORHEXIDINE GLUCONATE 213 G/1000ML
1 SOLUTION TOPICAL ONCE
Refills: 0 | Status: COMPLETED | OUTPATIENT
Start: 2023-11-06 | End: 2023-11-06

## 2023-11-06 RX ORDER — ALPRAZOLAM 0.25 MG
1 TABLET ORAL
Refills: 0 | DISCHARGE

## 2023-11-06 RX ORDER — CEFAZOLIN SODIUM 1 G
2000 VIAL (EA) INJECTION ONCE
Refills: 0 | Status: COMPLETED | OUTPATIENT
Start: 2023-11-06 | End: 2023-11-06

## 2023-11-06 RX ORDER — APREPITANT 80 MG/1
40 CAPSULE ORAL ONCE
Refills: 0 | Status: COMPLETED | OUTPATIENT
Start: 2023-11-06 | End: 2023-11-06

## 2023-11-06 RX ORDER — ALPRAZOLAM 0.25 MG
0.5 TABLET ORAL DAILY
Refills: 0 | Status: DISCONTINUED | OUTPATIENT
Start: 2023-11-06 | End: 2023-11-07

## 2023-11-06 RX ORDER — FLUTICASONE PROPIONATE 50 MCG
2 SPRAY, SUSPENSION NASAL
Refills: 0 | Status: DISCONTINUED | OUTPATIENT
Start: 2023-11-06 | End: 2023-11-07

## 2023-11-06 RX ORDER — ACETAMINOPHEN 500 MG
1000 TABLET ORAL ONCE
Refills: 0 | Status: COMPLETED | OUTPATIENT
Start: 2023-11-06 | End: 2023-11-06

## 2023-11-06 RX ORDER — ENOXAPARIN SODIUM 100 MG/ML
30 INJECTION SUBCUTANEOUS EVERY 12 HOURS
Refills: 0 | Status: DISCONTINUED | OUTPATIENT
Start: 2023-11-07 | End: 2023-11-07

## 2023-11-06 RX ORDER — FLUTICASONE PROPIONATE 50 MCG
1 SPRAY, SUSPENSION NASAL
Refills: 0 | DISCHARGE

## 2023-11-06 RX ORDER — FENTANYL CITRATE 50 UG/ML
25 INJECTION INTRAVENOUS
Refills: 0 | Status: DISCONTINUED | OUTPATIENT
Start: 2023-11-06 | End: 2023-11-06

## 2023-11-06 RX ORDER — ACETAMINOPHEN 500 MG
1000 TABLET ORAL EVERY 8 HOURS
Refills: 0 | Status: COMPLETED | OUTPATIENT
Start: 2023-11-06 | End: 2023-11-07

## 2023-11-06 RX ORDER — CEFAZOLIN SODIUM 1 G
2000 VIAL (EA) INJECTION EVERY 8 HOURS
Refills: 0 | Status: COMPLETED | OUTPATIENT
Start: 2023-11-06 | End: 2023-11-07

## 2023-11-06 RX ORDER — POLYETHYLENE GLYCOL 3350 17 G/17G
17 POWDER, FOR SOLUTION ORAL DAILY
Refills: 0 | Status: DISCONTINUED | OUTPATIENT
Start: 2023-11-06 | End: 2023-11-07

## 2023-11-06 RX ADMIN — Medication 1000 MILLIGRAM(S): at 07:01

## 2023-11-06 RX ADMIN — Medication 400 MILLIGRAM(S): at 21:19

## 2023-11-06 RX ADMIN — METHOCARBAMOL 500 MILLIGRAM(S): 500 TABLET, FILM COATED ORAL at 21:20

## 2023-11-06 RX ADMIN — Medication 50 MILLIGRAM(S): at 14:32

## 2023-11-06 RX ADMIN — BENZOCAINE AND MENTHOL 1 LOZENGE: 5; 1 LIQUID ORAL at 14:32

## 2023-11-06 RX ADMIN — Medication 400 MILLIGRAM(S): at 14:03

## 2023-11-06 RX ADMIN — FENTANYL CITRATE 25 MICROGRAM(S): 50 INJECTION INTRAVENOUS at 11:58

## 2023-11-06 RX ADMIN — BENZOCAINE AND MENTHOL 1 LOZENGE: 5; 1 LIQUID ORAL at 18:22

## 2023-11-06 RX ADMIN — METHOCARBAMOL 500 MILLIGRAM(S): 500 TABLET, FILM COATED ORAL at 14:32

## 2023-11-06 RX ADMIN — Medication 1000 MILLIGRAM(S): at 06:31

## 2023-11-06 RX ADMIN — Medication 100 MILLIGRAM(S): at 16:28

## 2023-11-06 RX ADMIN — SODIUM CHLORIDE 50 MILLILITER(S): 9 INJECTION, SOLUTION INTRAVENOUS at 14:32

## 2023-11-06 RX ADMIN — CHLORHEXIDINE GLUCONATE 1 APPLICATION(S): 213 SOLUTION TOPICAL at 06:31

## 2023-11-06 RX ADMIN — Medication 1000 MILLIGRAM(S): at 14:30

## 2023-11-06 RX ADMIN — APREPITANT 40 MILLIGRAM(S): 80 CAPSULE ORAL at 06:31

## 2023-11-06 RX ADMIN — FENTANYL CITRATE 25 MICROGRAM(S): 50 INJECTION INTRAVENOUS at 12:10

## 2023-11-06 RX ADMIN — LORATADINE 10 MILLIGRAM(S): 10 TABLET ORAL at 18:22

## 2023-11-06 RX ADMIN — Medication 50 MILLIGRAM(S): at 21:19

## 2023-11-06 RX ADMIN — Medication 1000 MILLIGRAM(S): at 23:19

## 2023-11-06 NOTE — BRIEF OPERATIVE NOTE - NSICDXBRIEFPROCEDURE_GEN_ALL_CORE_FT
PROCEDURES:  Anterior cervical discectomy and fusion (ACDF) at 2 levels 06-Nov-2023 11:41:03  Yonas Alvarado

## 2023-11-06 NOTE — PHYSICAL THERAPY INITIAL EVALUATION ADULT - ADDITIONAL COMMENTS
Pt lives in a private home with wife and children there are 4 steps to enter, +handrail, 1 flight of stairs to  bedroom/bathroom, able to stay on first floor if necessary. Pt performed ADL/IADLs independently. Ambulates with no assistive device. +driving, +working

## 2023-11-06 NOTE — PATIENT PROFILE ADULT - FALL HARM RISK - UNIVERSAL INTERVENTIONS
Bed in lowest position, wheels locked, appropriate side rails in place/Call bell, personal items and telephone in reach/Instruct patient to call for assistance before getting out of bed or chair/Non-slip footwear when patient is out of bed/[place] to call system/Physically safe environment - no spills, clutter or unnecessary equipment/Purposeful Proactive Rounding/Room/bathroom lighting operational, light cord in reach

## 2023-11-06 NOTE — PHYSICAL THERAPY INITIAL EVALUATION ADULT - GENERAL OBSERVATIONS, REHAB EVAL
Pt received semi supine in bed +PACU Monitoring lines, +IVL, +hemovac, +farias, ok for PT per BUTCH Steiner.

## 2023-11-06 NOTE — PHYSICAL THERAPY INITIAL EVALUATION ADULT - NSPTDISCHREC_GEN_A_CORE
home with assist from family as needed for functional mobility and outpatient PT when cleared by MD/Outpatient PT

## 2023-11-06 NOTE — PRE-OP CHECKLIST - ADVANCE DIRECTIVE ADDRESSED/READDRESSED
[FreeTextEntry1] : Continue balanced diet with all food groups. Brush teeth twice a day with toothbrush. Recommend visit to dentist. Maintain consistent daily routines and sleep schedule. Personal hygiene, puberty, and sexual health reviewed. Risky behaviors assessed. School discussed. Limit screen time to no more than 2 hours per day. Encourage physical activity.\par Return 1 year for routine well child check.\par return to office in the fall for a flu vaccine\par  done

## 2023-11-06 NOTE — PHYSICAL THERAPY INITIAL EVALUATION ADULT - PERTINENT HX OF CURRENT PROBLEM, REHAB EVAL
38 year old male with GERD, history of long COVID-19 (in 2020), allergic rhinitis-follows with pulmonologist, Pt had rash in March 2022 possibly attributed to Propofol or Nystatin after endoscopy.  Pt saw allergist in Sept this year and was cleared for use of Propofol for surgery.  Pt with C spine spondylosis, chronic neck and left arm pain and weakness, progressively worsening, not improved with PT.  Presents to PST pre op for C4-C5 anterior cervical discectomy and fusion on 11/6/23 with Dr. Castro.

## 2023-11-07 ENCOUNTER — TRANSCRIPTION ENCOUNTER (OUTPATIENT)
Age: 38
End: 2023-11-07

## 2023-11-07 VITALS
HEART RATE: 50 BPM | TEMPERATURE: 98 F | SYSTOLIC BLOOD PRESSURE: 130 MMHG | DIASTOLIC BLOOD PRESSURE: 80 MMHG | OXYGEN SATURATION: 98 %

## 2023-11-07 LAB
ANION GAP SERPL CALC-SCNC: 12 MMOL/L — SIGNIFICANT CHANGE UP (ref 5–17)
ANION GAP SERPL CALC-SCNC: 12 MMOL/L — SIGNIFICANT CHANGE UP (ref 5–17)
BASOPHILS # BLD AUTO: 0.01 K/UL — SIGNIFICANT CHANGE UP (ref 0–0.2)
BASOPHILS # BLD AUTO: 0.01 K/UL — SIGNIFICANT CHANGE UP (ref 0–0.2)
BASOPHILS NFR BLD AUTO: 0.1 % — SIGNIFICANT CHANGE UP (ref 0–2)
BASOPHILS NFR BLD AUTO: 0.1 % — SIGNIFICANT CHANGE UP (ref 0–2)
BUN SERPL-MCNC: 8 MG/DL — SIGNIFICANT CHANGE UP (ref 7–23)
BUN SERPL-MCNC: 8 MG/DL — SIGNIFICANT CHANGE UP (ref 7–23)
CALCIUM SERPL-MCNC: 9.3 MG/DL — SIGNIFICANT CHANGE UP (ref 8.4–10.5)
CALCIUM SERPL-MCNC: 9.3 MG/DL — SIGNIFICANT CHANGE UP (ref 8.4–10.5)
CHLORIDE SERPL-SCNC: 107 MMOL/L — SIGNIFICANT CHANGE UP (ref 96–108)
CHLORIDE SERPL-SCNC: 107 MMOL/L — SIGNIFICANT CHANGE UP (ref 96–108)
CO2 SERPL-SCNC: 22 MMOL/L — SIGNIFICANT CHANGE UP (ref 22–31)
CO2 SERPL-SCNC: 22 MMOL/L — SIGNIFICANT CHANGE UP (ref 22–31)
CREAT SERPL-MCNC: 0.7 MG/DL — SIGNIFICANT CHANGE UP (ref 0.5–1.3)
CREAT SERPL-MCNC: 0.7 MG/DL — SIGNIFICANT CHANGE UP (ref 0.5–1.3)
EGFR: 121 ML/MIN/1.73M2 — SIGNIFICANT CHANGE UP
EGFR: 121 ML/MIN/1.73M2 — SIGNIFICANT CHANGE UP
EOSINOPHIL # BLD AUTO: 0.01 K/UL — SIGNIFICANT CHANGE UP (ref 0–0.5)
EOSINOPHIL # BLD AUTO: 0.01 K/UL — SIGNIFICANT CHANGE UP (ref 0–0.5)
EOSINOPHIL NFR BLD AUTO: 0.1 % — SIGNIFICANT CHANGE UP (ref 0–6)
EOSINOPHIL NFR BLD AUTO: 0.1 % — SIGNIFICANT CHANGE UP (ref 0–6)
GLUCOSE SERPL-MCNC: 102 MG/DL — HIGH (ref 70–99)
GLUCOSE SERPL-MCNC: 102 MG/DL — HIGH (ref 70–99)
HCT VFR BLD CALC: 41.5 % — SIGNIFICANT CHANGE UP (ref 39–50)
HCT VFR BLD CALC: 41.5 % — SIGNIFICANT CHANGE UP (ref 39–50)
HGB BLD-MCNC: 14 G/DL — SIGNIFICANT CHANGE UP (ref 13–17)
HGB BLD-MCNC: 14 G/DL — SIGNIFICANT CHANGE UP (ref 13–17)
IMM GRANULOCYTES NFR BLD AUTO: 0.3 % — SIGNIFICANT CHANGE UP (ref 0–0.9)
IMM GRANULOCYTES NFR BLD AUTO: 0.3 % — SIGNIFICANT CHANGE UP (ref 0–0.9)
LYMPHOCYTES # BLD AUTO: 1.32 K/UL — SIGNIFICANT CHANGE UP (ref 1–3.3)
LYMPHOCYTES # BLD AUTO: 1.32 K/UL — SIGNIFICANT CHANGE UP (ref 1–3.3)
LYMPHOCYTES # BLD AUTO: 14 % — SIGNIFICANT CHANGE UP (ref 13–44)
LYMPHOCYTES # BLD AUTO: 14 % — SIGNIFICANT CHANGE UP (ref 13–44)
MCHC RBC-ENTMCNC: 29.9 PG — SIGNIFICANT CHANGE UP (ref 27–34)
MCHC RBC-ENTMCNC: 29.9 PG — SIGNIFICANT CHANGE UP (ref 27–34)
MCHC RBC-ENTMCNC: 33.7 GM/DL — SIGNIFICANT CHANGE UP (ref 32–36)
MCHC RBC-ENTMCNC: 33.7 GM/DL — SIGNIFICANT CHANGE UP (ref 32–36)
MCV RBC AUTO: 88.7 FL — SIGNIFICANT CHANGE UP (ref 80–100)
MCV RBC AUTO: 88.7 FL — SIGNIFICANT CHANGE UP (ref 80–100)
MONOCYTES # BLD AUTO: 0.84 K/UL — SIGNIFICANT CHANGE UP (ref 0–0.9)
MONOCYTES # BLD AUTO: 0.84 K/UL — SIGNIFICANT CHANGE UP (ref 0–0.9)
MONOCYTES NFR BLD AUTO: 8.9 % — SIGNIFICANT CHANGE UP (ref 2–14)
MONOCYTES NFR BLD AUTO: 8.9 % — SIGNIFICANT CHANGE UP (ref 2–14)
NEUTROPHILS # BLD AUTO: 7.25 K/UL — SIGNIFICANT CHANGE UP (ref 1.8–7.4)
NEUTROPHILS # BLD AUTO: 7.25 K/UL — SIGNIFICANT CHANGE UP (ref 1.8–7.4)
NEUTROPHILS NFR BLD AUTO: 76.6 % — SIGNIFICANT CHANGE UP (ref 43–77)
NEUTROPHILS NFR BLD AUTO: 76.6 % — SIGNIFICANT CHANGE UP (ref 43–77)
NRBC # BLD: 0 /100 WBCS — SIGNIFICANT CHANGE UP (ref 0–0)
NRBC # BLD: 0 /100 WBCS — SIGNIFICANT CHANGE UP (ref 0–0)
PLATELET # BLD AUTO: 206 K/UL — SIGNIFICANT CHANGE UP (ref 150–400)
PLATELET # BLD AUTO: 206 K/UL — SIGNIFICANT CHANGE UP (ref 150–400)
POTASSIUM SERPL-MCNC: 3.7 MMOL/L — SIGNIFICANT CHANGE UP (ref 3.5–5.3)
POTASSIUM SERPL-MCNC: 3.7 MMOL/L — SIGNIFICANT CHANGE UP (ref 3.5–5.3)
POTASSIUM SERPL-SCNC: 3.7 MMOL/L — SIGNIFICANT CHANGE UP (ref 3.5–5.3)
POTASSIUM SERPL-SCNC: 3.7 MMOL/L — SIGNIFICANT CHANGE UP (ref 3.5–5.3)
RBC # BLD: 4.68 M/UL — SIGNIFICANT CHANGE UP (ref 4.2–5.8)
RBC # BLD: 4.68 M/UL — SIGNIFICANT CHANGE UP (ref 4.2–5.8)
RBC # FLD: 12.2 % — SIGNIFICANT CHANGE UP (ref 10.3–14.5)
RBC # FLD: 12.2 % — SIGNIFICANT CHANGE UP (ref 10.3–14.5)
SODIUM SERPL-SCNC: 141 MMOL/L — SIGNIFICANT CHANGE UP (ref 135–145)
SODIUM SERPL-SCNC: 141 MMOL/L — SIGNIFICANT CHANGE UP (ref 135–145)
WBC # BLD: 9.46 K/UL — SIGNIFICANT CHANGE UP (ref 3.8–10.5)
WBC # BLD: 9.46 K/UL — SIGNIFICANT CHANGE UP (ref 3.8–10.5)
WBC # FLD AUTO: 9.46 K/UL — SIGNIFICANT CHANGE UP (ref 3.8–10.5)
WBC # FLD AUTO: 9.46 K/UL — SIGNIFICANT CHANGE UP (ref 3.8–10.5)

## 2023-11-07 PROCEDURE — C1889: CPT

## 2023-11-07 PROCEDURE — 94640 AIRWAY INHALATION TREATMENT: CPT

## 2023-11-07 PROCEDURE — 97162 PT EVAL MOD COMPLEX 30 MIN: CPT

## 2023-11-07 PROCEDURE — 76000 FLUOROSCOPY <1 HR PHYS/QHP: CPT

## 2023-11-07 PROCEDURE — 86900 BLOOD TYPING SEROLOGIC ABO: CPT

## 2023-11-07 PROCEDURE — 86850 RBC ANTIBODY SCREEN: CPT

## 2023-11-07 PROCEDURE — 80048 BASIC METABOLIC PNL TOTAL CA: CPT

## 2023-11-07 PROCEDURE — 97165 OT EVAL LOW COMPLEX 30 MIN: CPT

## 2023-11-07 PROCEDURE — 36415 COLL VENOUS BLD VENIPUNCTURE: CPT

## 2023-11-07 PROCEDURE — 85025 COMPLETE CBC W/AUTO DIFF WBC: CPT

## 2023-11-07 PROCEDURE — 86901 BLOOD TYPING SEROLOGIC RH(D): CPT

## 2023-11-07 PROCEDURE — C1713: CPT

## 2023-11-07 PROCEDURE — 82962 GLUCOSE BLOOD TEST: CPT

## 2023-11-07 RX ORDER — METHOCARBAMOL 500 MG/1
1 TABLET, FILM COATED ORAL
Qty: 30 | Refills: 0
Start: 2023-11-07

## 2023-11-07 RX ORDER — ACETAMINOPHEN 500 MG
1 TABLET ORAL
Qty: 0 | Refills: 0 | DISCHARGE

## 2023-11-07 RX ORDER — SENNA PLUS 8.6 MG/1
2 TABLET ORAL
Qty: 0 | Refills: 0 | DISCHARGE
Start: 2023-11-07

## 2023-11-07 RX ORDER — POLYETHYLENE GLYCOL 3350 17 G/17G
17 POWDER, FOR SOLUTION ORAL
Qty: 0 | Refills: 0 | DISCHARGE
Start: 2023-11-07

## 2023-11-07 RX ORDER — OXYCODONE HYDROCHLORIDE 5 MG/1
1 TABLET ORAL
Qty: 20 | Refills: 0
Start: 2023-11-07

## 2023-11-07 RX ADMIN — Medication 50 MILLIGRAM(S): at 06:04

## 2023-11-07 RX ADMIN — METHOCARBAMOL 500 MILLIGRAM(S): 500 TABLET, FILM COATED ORAL at 06:04

## 2023-11-07 RX ADMIN — SODIUM CHLORIDE 50 MILLILITER(S): 9 INJECTION, SOLUTION INTRAVENOUS at 08:34

## 2023-11-07 RX ADMIN — BENZOCAINE AND MENTHOL 1 LOZENGE: 5; 1 LIQUID ORAL at 06:23

## 2023-11-07 RX ADMIN — Medication 400 MILLIGRAM(S): at 06:05

## 2023-11-07 RX ADMIN — Medication 2 SPRAY(S): at 08:34

## 2023-11-07 RX ADMIN — Medication 100 MILLIGRAM(S): at 00:13

## 2023-11-07 NOTE — DISCHARGE NOTE NURSING/CASE MANAGEMENT/SOCIAL WORK - PATIENT PORTAL LINK FT
You can access the FollowMyHealth Patient Portal offered by [facility] by registering at the following website: [email or web address]. By joining [facility]’s FollowMyHealth portal, you will also be able to view your health information using other applications (apps) compatible with our system.

## 2023-11-07 NOTE — PROGRESS NOTE ADULT - SUBJECTIVE AND OBJECTIVE BOX
CHIEF COMPLAINT: patient having breakfast, swallowing well, c/o sore throat, +drain, +voiding after farias removed this am  reports some tingling thats unchanged from preop    Vital Signs Last 24 Hrs  T(C): 36.8 (07 Nov 2023 12:36), Max: 37.1 (07 Nov 2023 08:11)  T(F): 98.2 (07 Nov 2023 12:36), Max: 98.8 (07 Nov 2023 08:11)  HR: 50 (07 Nov 2023 12:36) (50 - 82)  BP: 130/80 (07 Nov 2023 12:36) (110/62 - 144/79)  BP(mean): 90 (06 Nov 2023 17:00) (90 - 98)  RR: 18 (07 Nov 2023 08:11) (16 - 19)  SpO2: 98% (07 Nov 2023 12:36) (94% - 98%)    Parameters below as of 07 Nov 2023 12:36  Patient On (Oxygen Delivery Method): room air    DRAINS: [] NICK (cc/24h) [x] HMV (10cc/24h)    PHYSICAL EXAM:    General: No Acute Distress     Neurological: Awake, alert oriented to person, place and time, Following Commands, PERRL, EOMI, Face Symmetrical, Speech Fluent, Moving all extremities, Muscle Strength normal in all four extremities, No Drift, Sensation to Light Touch Intact    Pulmonary: Clear to Auscultation, No Rales, No Rhonchi, No Wheezes     Cardiovascular: S1, S2, Regular Rate and Rhythm     Gastrointestinal: Soft, Nontender, Nondistended     Incision: +dressing, +prineo, drain removed without difficulty; no colleciton noted     LABS:                        14.0   9.46  )-----------( 206      ( 07 Nov 2023 07:11 )             41.5    11-07    141  |  107  |  8   ----------------------------<  102<H>  3.7   |  22  |  0.70    Ca    9.3      07 Nov 2023 07:10          11-06 @ 07:01  -  11-07 @ 07:00  --------------------------------------------------------  IN: 1230 mL / OUT: 5420 mL / NET: -4190 mL    11-07 @ 07:01  - 11-07 @ 15:42  --------------------------------------------------------  IN: 240 mL / OUT: 450 mL / NET: -210 mL        MEDICATIONS:  Anticoagulation:  enoxaparin Injectable 30 milliGRAM(s) SubCutaneous every 12 hours    Neuro:  ALPRAZolam 0.5 milliGRAM(s) Oral daily PRN for anxiety  methocarbamol 500 milliGRAM(s) Oral every 8 hours  ondansetron Injectable 4 milliGRAM(s) IV Push every 6 hours PRN Nausea and/or Vomiting  oxyCODONE    IR 5 milliGRAM(s) Oral every 6 hours PRN Moderate Pain (4 - 6)    Pulm:  loratadine 10 milliGRAM(s) Oral daily    GI/:  polyethylene glycol 3350 17 Gram(s) Oral daily  senna 2 Tablet(s) Oral at bedtime    Other:   benzocaine/menthol Lozenge 1 Lozenge Oral three times a day PRN Sore Throat  fluticasone propionate 50 MICROgram(s)/spray Nasal Spray 2 Spray(s) Both Nostrils <User Schedule>    DIET: [x] Regular [] CCD [] Renal [] Puree [] Dysphagia [] Tube Feeds:       
Patient seen and examined at bedside.    --Anticoagulation--    T(C): 36.4 (11-06-23 @ 11:24), Max: 36.5 (11-06-23 @ 05:40)  HR: 65 (11-06-23 @ 14:03) (63 - 85)  BP: 123/69 (11-06-23 @ 14:03) (121/76 - 145/83)  RR: 16 (11-06-23 @ 14:03) (14 - 18)  SpO2: 96% (11-06-23 @ 14:03) (95% - 99%)  Wt(kg): --    Exam: AOx3, PERRL, NUNEZ 5/5, SILT.

## 2023-11-07 NOTE — CONSULT NOTE ADULT - SUBJECTIVE AND OBJECTIVE BOX
Patient is a 38y old  Male who presents with a chief complaint of 11/6/23 C5-6, C6-7 anterior cervical discectomy and fusion.  (07 Nov 2023 11:03)      HPI:  38 year old male with GERD, history of long COVID-19 (in 2020), allergic rhinitis-follows with pulmonologist, Pt had rash in March 2022 possibly attributed to Propofol or Nystatin after endoscopy.  Pt saw allergist in Sept this year and was cleared for use of Propofol for surgery.  Pt with C spine spondylosis, chronic neck and left arm pain and weakness, progressively worsening, not improved with PT.  Presents to PST pre op for C4-C5 anterior cervical discectomy and fusion on 11/6/23 with Dr. Castro. (09 Oct 2023 07:59)      PAST MEDICAL & SURGICAL HISTORY:  No pertinent past medical history      2019 novel coronavirus disease (COVID-19)      Cervical spondylosis      Anxiety      S/P colonoscopy      H/O endoscopy      S/P nasal surgery          FAMILY HISTORY:      SOCIAL HISTORY:    Allergies    nystatin (Rash)    Intolerances          REVIEW OF SYSTEMS:  General: no weakness, no fever/chills, no weight loss/gain  Skin/Breast: no rash, no jaundice  Ophthalmologic: no vision changes, no dry eyes   Respiratory and Thorax: no cough, no wheezing, no hemoptysis, no dyspnea  Cardiovascular: no chest pain, no shortness of breath, no orthopnea  Gastrointestinal: no n/v/d, no abdominal pain, no dysphagia   Genitourinary: no dysuria, no frequency, no nocturia, no hematuria  Musculoskeletal: no trauma, no sprain/strain, no myalgias, no arthralgias, no fracture  Neurological: no HA, no dizziness, no weakness, no numbness  Psychiatric: no depression, no SI/HI  Hematology/Lymphatics: no easy bruising  Endocrine: no heat or cold intolerance. no weight gain or loss  Allergic/Immunologic: no allergy or recent reaction     SUBJECTIVE / OVERNIGHT EVENTS:    patient seen and examined  resting comfortably in bed  walked with PT - feeling well  drain removed    Vital Signs Last 24 Hrs  T(C): 37.1 (07 Nov 2023 08:11), Max: 37.1 (07 Nov 2023 08:11)  T(F): 98.8 (07 Nov 2023 08:11), Max: 98.8 (07 Nov 2023 08:11)  HR: 59 (07 Nov 2023 08:11) (52 - 85)  BP: 113/74 (07 Nov 2023 08:11) (110/62 - 144/79)  BP(mean): 90 (06 Nov 2023 17:00) (89 - 103)  RR: 18 (07 Nov 2023 08:11) (14 - 19)  SpO2: 98% (07 Nov 2023 08:11) (94% - 99%)    Parameters below as of 07 Nov 2023 08:11  Patient On (Oxygen Delivery Method): room air      I&O's Summary    06 Nov 2023 07:01  -  07 Nov 2023 07:00  --------------------------------------------------------  IN: 1230 mL / OUT: 5420 mL / NET: -4190 mL    07 Nov 2023 07:01  -  07 Nov 2023 11:20  --------------------------------------------------------  IN: 240 mL / OUT: 450 mL / NET: -210 mL        PHYSICAL EXAM:  GENERAL: NAD, Comfortable  HEAD:  Atraumatic, Normocephalic  EYES: EOMI, PERRLA, conjunctiva and sclera clear  NECK: Supple, No JVD, dsg c/d/i  CHEST/LUNG: Clear to auscultation bilaterally; No wheeze  HEART: Regular rate and rhythm; No murmurs, rubs, or gallops  ABDOMEN: Soft, Nontender, Nondistended; Bowel sounds present  NEURO: AAOx3, no focal deficit, 5/5 b/l extremities  EXTREMITIES:  2+ Peripheral Pulses, No clubbing, cyanosis, or edema  SKIN: No rashes or lesions    LABS:                        14.0   9.46  )-----------( 206      ( 07 Nov 2023 07:11 )             41.5     11-07    141  |  107  |  8   ----------------------------<  102<H>  3.7   |  22  |  0.70    Ca    9.3      07 Nov 2023 07:10        CAPILLARY BLOOD GLUCOSE      POCT Blood Glucose.: 104 mg/dL (06 Nov 2023 12:04)        Urinalysis Basic - ( 07 Nov 2023 07:10 )    Color: x / Appearance: x / SG: x / pH: x  Gluc: 102 mg/dL / Ketone: x  / Bili: x / Urobili: x   Blood: x / Protein: x / Nitrite: x   Leuk Esterase: x / RBC: x / WBC x   Sq Epi: x / Non Sq Epi: x / Bacteria: x        RADIOLOGY & ADDITIONAL TESTS:    Imaging Personally Reviewed:  [x] YES  [ ] NO    Consultant(s) Notes Reviewed:  [x] YES  [ ] NO      MEDICATIONS  (STANDING):  enoxaparin Injectable 30 milliGRAM(s) SubCutaneous every 12 hours  fluticasone propionate 50 MICROgram(s)/spray Nasal Spray 2 Spray(s) Both Nostrils <User Schedule>  loratadine 10 milliGRAM(s) Oral daily  methocarbamol 500 milliGRAM(s) Oral every 8 hours  polyethylene glycol 3350 17 Gram(s) Oral daily  senna 2 Tablet(s) Oral at bedtime    MEDICATIONS  (PRN):  ALPRAZolam 0.5 milliGRAM(s) Oral daily PRN for anxiety  benzocaine/menthol Lozenge 1 Lozenge Oral three times a day PRN Sore Throat  ondansetron Injectable 4 milliGRAM(s) IV Push every 6 hours PRN Nausea and/or Vomiting  oxyCODONE    IR 5 milliGRAM(s) Oral every 6 hours PRN Moderate Pain (4 - 6)      Care Discussed with Consultants/Other Providers [x] YES  [ ] NO

## 2023-11-07 NOTE — CHART NOTE - NSCHARTNOTEFT_GEN_A_CORE
CAPRINI SCORE [CLOT] Score on Admission for     AGE RELATED RISK FACTORS                                                       MOBILITY RELATED FACTORS  [ ] Age 41-60 years                                            (1 Point)                  [ ] Bed rest                                                        (1 Point)  [ ] Age: 61-74 years                                           (2 Points)                 [ ] Plaster cast                                                   (2 Points)  [ ] Age= 75 years                                              (3 Points)                 [ ] Bed bound for more than 72 hours                 (2 Points)    DISEASE RELATED RISK FACTORS                                               GENDER SPECIFIC FACTORS  [ ] Edema in the lower extremities                       (1 Point)                  [ ] Pregnancy                                                     (1 Point)  [ ] Varicose veins                                               (1 Point)                  [ ] Post-partum < 6 weeks                                   (1 Point)             [x ] BMI > 25 Kg/m2                                            (1 Point)                  [ ] Hormonal therapy  or oral contraception          (1 Point)                 [ ] Sepsis (in the previous month)                        (1 Point)            [ ] History of pregnancy complications                 (1 point)  [ ] Pneumonia or serious lung disease                                            [ ] Unexplained or recurrent                     (1 Point)           (in the previous month)                               (1 Point)  [ ] Abnormal pulmonary function test                     (1 Point)                 SURGERY RELATED RISK FACTORS (include planned surgeries)  [ ] Acute myocardial infarction                              (1 Point)                 [ ]  Section                                             (1 Point)  [ ] Congestive heart failure (in the previous month)  (1 Point)         [ ] Minor surgery                                                  (1 Point)   [ ] Inflammatory bowel disease                             (1 Point)                 [ ] Arthroscopic surgery                                        (2 Points)  [ ] Central venous access                                      (2 Points)                 [ x] General surgery lasting more than 45 minutes   (2 Points)       [ ] Stroke (in the previous month)                          (5 Points)               [ ] Elective arthroplasty                                         (5 Points)            [ ] current or past malignancy                              (2 Points)                                                                                                       HEMATOLOGY RELATED FACTORS                                                 TRAUMA RELATED RISK FACTORS  [ ] Prior episodes of VTE                                     (3 Points)                [ ] Fracture of the hip, pelvis, or leg                       (5 Points)  [ ] Positive family history for VTE                         (3 Points)             [ ] Acute spinal cord injury (in the previous month)  (5 Points)  [ ] Prothrombin 15469 A                                     (3 Points)                [ ] Paralysis  (less than 1 month)                             (5 Points)  [ ] Factor V Leiden                                             (3 Points)                  [ ] Multiple Trauma within 1 month                        (5 Points)  [ ] Lupus anticoagulants                                     (3 Points)                                                           [ ] Anticardiolipin antibodies                               (3 Points)                                                       [ ] High homocysteine in the blood                      (3 Points)                                             [ ] Other congenital or acquired thrombophilia      (3 Points)                                                [ ] Heparin induced thrombocytopenia                  (3 Points)                                          Total Score [     3     ]    Risk:  Very low 0   Low 1 to 2   Moderate 3 to 4   High =5       VTE Prophylaxis Recommendations:  [ x] mechanical pneumatic compression devices                                      [ ] contraindicated: _____________________  [x ] chemo prophylaxis                                                                                  [ ] contraindicated _____________________    **** HIGH LIKELIHOOD DVT PRESENT ON ADMISSION  [ ] (please order LE dopplers within 24 hours of admission)

## 2023-11-07 NOTE — DISCHARGE NOTE PROVIDER - CARE PROVIDER_API CALL
Otoniel Castro  Neurosurgery  [address] [place] [address], [facility] [address]  [place] [address]  Phone: [phone]  Fax: [phone]  Follow Up Time:

## 2023-11-07 NOTE — DISCHARGE NOTE PROVIDER - NSDCFUADDAPPT_GEN_ALL_CORE_FT
PLease follow up with the following physicians in 1-2 weeks:  1- Dr Castro- neurosurgeon, please call office for appointment.   2- PCP     You have been started on a new medication, oxycodone.  Oxycodone helps to control pain. Oxycodone is an additive medication so it is important to limit the use of this medication.  Side effects include nausea, vomiting, constipation, dry mouth, lightheadedness, dizziness or drowsiness.  It is important to tell your physician if you experience any of these side effects.   You have been started on a new medication, robaxin which helps to treat muscle spasms.   Some common side effects are dry mouth, headache, blurred vision, drowsiness, dizziness, fatigue, diarrhea or constipation.  If you experience any of these side effects please let your doctor know.

## 2023-11-07 NOTE — CONSULT NOTE ADULT - ASSESSMENT
Patient is a 38 year old male with GERD, history of long COVID-19 (in 2020), allergic rhinitis-follows with pulmonologist, Pt had rash in March 2022 possibly attributed to Propofol or Nystatin after endoscopy.  Pt saw allergist in Sept this year and was cleared for use of Propofol for surgery.  Pt with C spine spondylosis, chronic neck and left arm pain and weakness, progressively worsening, not improved with PT.  Scheduled for C4-C5 anterior cervical discectomy and fusion on 11/6/23 with Dr. Castro. Consult called for post-op medical mgmt.    # s/p C4-C5 anterior cervical discectomy and fusion on 11/6/23:  Incision care per Neurosx  Pain mgmt  Bowel regimen  Incentive spirometry  PT eval -> outpatient PT  Advance diet as tolerated  Care per Neurosx    # Allergies:  C/w Claritin     # DVT ppx:  Lovenox    [facility]  [phone]  MD. PRETTY [place]

## 2023-11-07 NOTE — OCCUPATIONAL THERAPY INITIAL EVALUATION ADULT - LIVES WITH, PROFILE
Pt lives in a house with his wife and children (3) +4 HEMANT +HR +2 flights inside (1 down to basement 1 up to bedroom), but can stay on first floor if necessary. Pt was previously independent with ADLs and mobility./children/spouse

## 2023-11-07 NOTE — OCCUPATIONAL THERAPY INITIAL EVALUATION ADULT - DIAGNOSIS, OT EVAL
Pt presents with impaired sensation, decreased strength, ROM, impacting ability to perform ADL and mobility.

## 2023-11-07 NOTE — DISCHARGE NOTE PROVIDER - NSDCMRMEDTOKEN_GEN_ALL_CORE_FT
acetaminophen 650 mg oral tablet: 1 tab(s) orally every 6 hours as needed for  mild pain  cetirizine 10 mg oral tablet: 1 tab(s) orally once a day  Flonase 50 mcg/inh nasal spray: 1 spray(s) in each nostril once a day  methocarbamol 500 mg oral tablet: 1 tab(s) orally every 8 hours as needed for  muscle spasm  oxyCODONE 5 mg oral tablet: 1 tab(s) orally every 6 hours as needed for Moderate Pain (4 - 6) MDD: 4  polyethylene glycol 3350 oral powder for reconstitution: 17 gram(s) orally once a day  senna leaf extract oral tablet: 2 tab(s) orally once a day (at bedtime)  Vitamin D with Acetylcysteine, Folic Acid, and Minerals oral tablet: 1 tab(s) orally once a day  Xanax 0.5 mg oral tablet: 1 tab(s) orally once a day as needed for  anxiety

## 2023-11-07 NOTE — DISCHARGE NOTE PROVIDER - HOSPITAL COURSE
HPI:  38 year old male with GERD, history of long COVID-19 (in 2020), allergic rhinitis-follows with pulmonologist, Pt had rash in March 2022 possibly attributed to Propofol or Nystatin after endoscopy.  Pt saw allergist in Sept this year and was cleared for use of Propofol for surgery.  Pt with C spine spondylosis, chronic neck and left arm pain and weakness, progressively worsening, not improved with PT.  Presents to PST pre op for C4-C5 anterior cervical discectomy and fusion on 11/6/23 with Dr. Castro. (09 Oct 2023 07:59)    Patient admitted and underwent on 11/6/23 C5-6, C6-7 anterior cervical discectomy and fusion. He had routine post operative  care. Surgical drain removed on 11/7/23.   PT evaluated him and recommended outpatient PT. On the day of discharge he is medically and neurologically stable for discharge to home.

## 2023-11-07 NOTE — DISCHARGE NOTE PROVIDER - NSDCCPCAREPLAN_GEN_ALL_CORE_FT
PRINCIPAL DISCHARGE DIAGNOSIS  Diagnosis: Cervical spondylosis  Assessment and Plan of Treatment: 11/6/23 C5-6, C6-7 anterior cervical discectomy and fusion.      SECONDARY DISCHARGE DIAGNOSES  Diagnosis: GERD (gastroesophageal reflux disease)  Assessment and Plan of Treatment: Please continue medications and follow up with your primary care physician within 1-2 weeks.

## 2023-11-07 NOTE — DISCHARGE NOTE PROVIDER - NSDCFUADDINST_GEN_ALL_CORE_FT
please notify physician if fevers, bleeding, swelling, pain not relieved by medication, increased sluggishness or irritability, increased nausea or vomiting, inability to tolerate foods or liquids, new or increasing weakness/numbness/tingling.   please do not engage in strenuous activity, heavy lifting, drive, or return to work or school until cleared by surgeon.   please keep incision clean and dry, do not submerge wound in water for prolonged periods of time, pat dry after showering, and do not use any creams or ointments to incision.   You may shower today but don't get incision wet.   You may shower tomorrow and then remove dressing, pat dry, and leave open to air. Please let prineo incision strip fall off.   Please do not take NSAIDs- ie. advil, motrin, ibuprofen, aleve, aspirin, naproxen, etc. Tylenol/ acetaminophen ok to take.

## 2023-11-07 NOTE — OCCUPATIONAL THERAPY INITIAL EVALUATION ADULT - PERTINENT HX OF CURRENT PROBLEM, REHAB EVAL
38 year old male with GERD, history of long COVID-19 (in 2020), allergic rhinitis-follows with pulmonologist, Pt had rash in March 2022 possibly attributed to Propofol or Nystatin after endoscopy.  Pt saw allergist in Sept this year and was cleared for use of Propofol for surgery.  Pt with C spine spondylosis, chronic neck and left arm pain and weakness, progressively worsening, not improved with PT.  Presents to PST pre op for C4-C5 anterior cervical discectomy and fusion on 11/6/23 with Dr. Castro. Now S/P C5-6, C6-7 anterior cervical discectomy and fusion

## 2023-11-07 NOTE — PROGRESS NOTE ADULT - ASSESSMENT
HPI:  38 year old male with GERD, history of long COVID-19 (in 2020), allergic rhinitis-follows with pulmonologist, Pt had rash in March 2022 possibly attributed to Propofol or Nystatin after endoscopy.  Pt saw allergist in Sept this year and was cleared for use of Propofol for surgery.  Pt with C spine spondylosis, chronic neck and left arm pain and weakness, progressively worsening, not improved with PT.  Presents to PST pre op for C4-C5 anterior cervical discectomy and fusion on 11/6/23 with Dr. Castro. (09 Oct 2023 07:59)    PAST MEDICAL & SURGICAL HISTORY:  No pertinent past medical history      2019 novel coronavirus disease (COVID-19)      Cervical spondylosis      Anxiety      S/P colonoscopy      H/O endoscopy      S/P nasal surgery        PROCEDURE: 11/6/23 C5-6, C6-7 anterior cervical discectomy and fusion.       POD#1    PLAN:  Neuro: tyelnol and oxycodone PRN pain, robaxin for spasms  Respiratory: patient instructed on incentive spirometer  CV: normotensive  Heme/Onc: stable CBC              DVT ppx: [] SQH [x] SQL and venodynes bilaterally  Renal: voiding  ID: afebrile  GI: bowel regimen  PT/OT: outpt PT  Discussed with patient wound care, follow up plans, activity, and medications. Questions answered, and he verbalized understanding.   RXs sent to [facility]  d/c IVL and d/c home today    Will discussed with Dr Castro  [facility] # [id number]

## 2023-11-28 PROBLEM — M47.812 SPONDYLOSIS WITHOUT MYELOPATHY OR RADICULOPATHY, CERVICAL REGION: Chronic | Status: ACTIVE | Noted: 2023-10-09

## 2023-11-28 PROBLEM — F41.9 ANXIETY DISORDER, UNSPECIFIED: Chronic | Status: ACTIVE | Noted: 2023-10-09

## 2023-11-28 PROBLEM — U07.1 COVID-19: Chronic | Status: ACTIVE | Noted: 2023-10-09

## 2024-01-05 ENCOUNTER — APPOINTMENT (OUTPATIENT)
Dept: OTOLARYNGOLOGY | Facility: CLINIC | Age: 39
End: 2024-01-05

## 2024-02-09 ENCOUNTER — APPOINTMENT (OUTPATIENT)
Dept: PULMONOLOGY | Facility: CLINIC | Age: 39
End: 2024-02-09

## 2024-03-15 ENCOUNTER — NON-APPOINTMENT (OUTPATIENT)
Age: 39
End: 2024-03-15

## 2024-04-12 ENCOUNTER — NON-APPOINTMENT (OUTPATIENT)
Age: 39
End: 2024-04-12

## 2024-07-30 ENCOUNTER — NON-APPOINTMENT (OUTPATIENT)
Age: 39
End: 2024-07-30

## 2024-09-22 ENCOUNTER — NON-APPOINTMENT (OUTPATIENT)
Age: 39
End: 2024-09-22

## 2025-05-20 ENCOUNTER — NON-APPOINTMENT (OUTPATIENT)
Age: 40
End: 2025-05-20

## 2025-07-03 NOTE — PROCEDURE
Application Tool (Optional): Liquid Nitrogen Sprayer Aperture Size (Optional): C Render Note In Bullet Format When Appropriate: No Duration Of Freeze Thaw-Cycle (Seconds): 3 Show Applicator Variable?: Yes Detail Level: Detailed Post-Care Instructions: I reviewed with the patient in detail post-care instructions. Patient is to wear sunprotection, and avoid picking at any of the treated lesions. Pt may apply Vaseline to crusted or scabbing areas. Number Of Freeze-Thaw Cycles: 1 freeze-thaw cycle Consent: The patient's consent was obtained including but not limited to risks of crusting, scabbing, blistering, scarring, darker or lighter pigmentary change, recurrence, incomplete removal and infection. [FreeTextEntry1] : CXR revealed a normal sized heart; there was no evidence of infiltrate or effusion-- A normal chest radiograph.\par \par Chest CT (06.19.220) interval resolution of the prior mild right lung opacities.

## (undated) DEVICE — SUT BIOSYN 4-0 18" P-12

## (undated) DEVICE — ELCTR BOVIE TIP BLADE INSULATED 2.75" EDGE

## (undated) DEVICE — VENODYNE/SCD SLEEVE CALF LARGE

## (undated) DEVICE — MIDAS REX MR7 LUBRICANT DIFFUSER CARTRIDGE

## (undated) DEVICE — SUT ETHILON 3-0 30" FS-1

## (undated) DEVICE — GLV 7 PROTEXIS (WHITE)

## (undated) DEVICE — SPECIMEN CONTAINER 100ML

## (undated) DEVICE — NDL SPINAL 18G X 3.5" (PINK)

## (undated) DEVICE — GLV 7.5 PROTEXIS (WHITE)

## (undated) DEVICE — DRAPE MAYO STAND 30"

## (undated) DEVICE — DRAIN JACKSON PRATT 3 SPRING RESERVOIR W 10FR PVC DRAIN

## (undated) DEVICE — DRAPE TOWEL BLUE 17" X 24"

## (undated) DEVICE — SYR LUER LOK 20CC

## (undated) DEVICE — DRSG STERISTRIPS 0.5 X 4"

## (undated) DEVICE — Device

## (undated) DEVICE — DRAPE 1/2 SHEET 40X57"

## (undated) DEVICE — DRSG MASTISOL

## (undated) DEVICE — MARKING PEN W RULER

## (undated) DEVICE — DRSG TELFA 3 X 8

## (undated) DEVICE — WARMING BLANKET LOWER ADULT

## (undated) DEVICE — ELCTR SUBDERMAL NDL 27G X 1/2" WITH TWISTED PAIR

## (undated) DEVICE — WOUND IRR SURGIPHOR

## (undated) DEVICE — DRAPE 3/4 SHEET W REINFORCEMENT 56X77"

## (undated) DEVICE — PREP CHLORAPREP HI-LITE ORANGE 26ML

## (undated) DEVICE — MIDAS REX MR8 MATCH HEAD FLUTED LG BORE 3MM X 14CM

## (undated) DEVICE — SPONGE PEANUT AUTO COUNT

## (undated) DEVICE — DRAPE EQUIPMENT COVER 27"

## (undated) DEVICE — ELCTR 4-DISC 20MM 49" (RED, BLUE, GREEN, BLACK)

## (undated) DEVICE — SOL IRR POUR NS 0.9% 500ML

## (undated) DEVICE — FOLEY TRAY 16FR 5CC LTX UMETER CLOSED

## (undated) DEVICE — GLV 6.5 PROTEXIS (WHITE)

## (undated) DEVICE — GLV 8 PROTEXIS (WHITE)

## (undated) DEVICE — CANISTER SUCTION 2000CC

## (undated) DEVICE — SUT VICRYL 3-0 18" X-1 (POP-OFF)

## (undated) DEVICE — PACK LUMBAR LAMI

## (undated) DEVICE — POSITIONER FOAM EGG CRATE ULNAR 2PCS (PINK)

## (undated) DEVICE — SOL IRR POUR H2O 250ML

## (undated) DEVICE — DRILL BIT MEDTRONIC TRI FLAT ADJ

## (undated) DEVICE — ELCTR SUBDERMAL CORKSCREW NDL 1.2MM

## (undated) DEVICE — STAPLER SKIN VISI-STAT 35 WIDE

## (undated) DEVICE — ELCTR SUBDERMAL NDL CLASSIC 1.5M X 59" (6 COLOR)

## (undated) DEVICE — BIPOLAR FORCEP SYMMETRY BAYONET 7" X 1.5MM SMOOTH (SILVER)

## (undated) DEVICE — SUT SOFSILK 2-0 18" TIES

## (undated) DEVICE — DRSG TAPE HYPAFIX 4"